# Patient Record
Sex: FEMALE | Race: WHITE | NOT HISPANIC OR LATINO | Employment: FULL TIME | URBAN - METROPOLITAN AREA
[De-identification: names, ages, dates, MRNs, and addresses within clinical notes are randomized per-mention and may not be internally consistent; named-entity substitution may affect disease eponyms.]

---

## 2023-01-07 ENCOUNTER — HOSPITAL ENCOUNTER (EMERGENCY)
Facility: HOSPITAL | Age: 33
Discharge: HOME | End: 2023-01-07
Attending: EMERGENCY MEDICINE
Payer: MEDICAID

## 2023-01-07 VITALS
BODY MASS INDEX: 33.74 KG/M2 | DIASTOLIC BLOOD PRESSURE: 71 MMHG | WEIGHT: 215 LBS | OXYGEN SATURATION: 100 % | HEIGHT: 67 IN | RESPIRATION RATE: 17 BRPM | TEMPERATURE: 99 F | SYSTOLIC BLOOD PRESSURE: 103 MMHG | HEART RATE: 58 BPM

## 2023-01-07 DIAGNOSIS — R10.13 EPIGASTRIC PAIN: Primary | ICD-10-CM

## 2023-01-07 LAB
ALBUMIN SERPL BCP-MCNC: 4 G/DL (ref 3.5–5.2)
ALP SERPL-CCNC: 44 U/L (ref 55–135)
ALT SERPL W/O P-5'-P-CCNC: 18 U/L (ref 10–44)
ANION GAP SERPL CALC-SCNC: 9 MMOL/L (ref 8–16)
AST SERPL-CCNC: 17 U/L (ref 10–40)
B-HCG UR QL: NEGATIVE
BASOPHILS # BLD AUTO: 0.11 K/UL (ref 0–0.2)
BASOPHILS NFR BLD: 0.7 % (ref 0–1.9)
BILIRUB SERPL-MCNC: 0.5 MG/DL (ref 0.1–1)
BILIRUB UR QL STRIP: NEGATIVE
BUN SERPL-MCNC: 10 MG/DL (ref 6–20)
CALCIUM SERPL-MCNC: 8.6 MG/DL (ref 8.7–10.5)
CHLORIDE SERPL-SCNC: 105 MMOL/L (ref 95–110)
CLARITY UR: ABNORMAL
CO2 SERPL-SCNC: 23 MMOL/L (ref 23–29)
COLOR UR: YELLOW
CREAT SERPL-MCNC: 0.7 MG/DL (ref 0.5–1.4)
CTP QC/QA: YES
DIFFERENTIAL METHOD: ABNORMAL
EOSINOPHIL # BLD AUTO: 0.2 K/UL (ref 0–0.5)
EOSINOPHIL NFR BLD: 1 % (ref 0–8)
ERYTHROCYTE [DISTWIDTH] IN BLOOD BY AUTOMATED COUNT: 14.2 % (ref 11.5–14.5)
EST. GFR  (NO RACE VARIABLE): >60 ML/MIN/1.73 M^2
GLUCOSE SERPL-MCNC: 111 MG/DL (ref 70–110)
GLUCOSE UR QL STRIP: NEGATIVE
HCT VFR BLD AUTO: 46.2 % (ref 37–48.5)
HGB BLD-MCNC: 15.1 G/DL (ref 12–16)
HGB UR QL STRIP: ABNORMAL
IMM GRANULOCYTES # BLD AUTO: 0.11 K/UL (ref 0–0.04)
IMM GRANULOCYTES NFR BLD AUTO: 0.7 % (ref 0–0.5)
KETONES UR QL STRIP: NEGATIVE
LEUKOCYTE ESTERASE UR QL STRIP: NEGATIVE
LIPASE SERPL-CCNC: 32 U/L (ref 4–60)
LYMPHOCYTES # BLD AUTO: 3.9 K/UL (ref 1–4.8)
LYMPHOCYTES NFR BLD: 25.3 % (ref 18–48)
MCH RBC QN AUTO: 32.9 PG (ref 27–31)
MCHC RBC AUTO-ENTMCNC: 32.7 G/DL (ref 32–36)
MCV RBC AUTO: 101 FL (ref 82–98)
MONOCYTES # BLD AUTO: 1 K/UL (ref 0.3–1)
MONOCYTES NFR BLD: 6.3 % (ref 4–15)
NEUTROPHILS # BLD AUTO: 10.1 K/UL (ref 1.8–7.7)
NEUTROPHILS NFR BLD: 66 % (ref 38–73)
NITRITE UR QL STRIP: NEGATIVE
NRBC BLD-RTO: 0 /100 WBC
PH UR STRIP: 7 [PH] (ref 5–8)
PLATELET # BLD AUTO: 343 K/UL (ref 150–450)
PMV BLD AUTO: 10.9 FL (ref 9.2–12.9)
POTASSIUM SERPL-SCNC: 3.9 MMOL/L (ref 3.5–5.1)
PROT SERPL-MCNC: 7.4 G/DL (ref 6–8.4)
PROT UR QL STRIP: ABNORMAL
RBC # BLD AUTO: 4.59 M/UL (ref 4–5.4)
SODIUM SERPL-SCNC: 137 MMOL/L (ref 136–145)
SP GR UR STRIP: >1.03 (ref 1–1.03)
URN SPEC COLLECT METH UR: ABNORMAL
UROBILINOGEN UR STRIP-ACNC: ABNORMAL EU/DL
WBC # BLD AUTO: 15.28 K/UL (ref 3.9–12.7)

## 2023-01-07 PROCEDURE — 96360 HYDRATION IV INFUSION INIT: CPT | Mod: 59

## 2023-01-07 PROCEDURE — 25500020 PHARM REV CODE 255: Performed by: EMERGENCY MEDICINE

## 2023-01-07 PROCEDURE — 81025 URINE PREGNANCY TEST: CPT | Performed by: EMERGENCY MEDICINE

## 2023-01-07 PROCEDURE — 83690 ASSAY OF LIPASE: CPT | Performed by: EMERGENCY MEDICINE

## 2023-01-07 PROCEDURE — 96361 HYDRATE IV INFUSION ADD-ON: CPT

## 2023-01-07 PROCEDURE — 81003 URINALYSIS AUTO W/O SCOPE: CPT | Performed by: EMERGENCY MEDICINE

## 2023-01-07 PROCEDURE — 99285 EMERGENCY DEPT VISIT HI MDM: CPT | Mod: 25

## 2023-01-07 PROCEDURE — 85025 COMPLETE CBC W/AUTO DIFF WBC: CPT | Performed by: EMERGENCY MEDICINE

## 2023-01-07 PROCEDURE — 25000003 PHARM REV CODE 250: Performed by: EMERGENCY MEDICINE

## 2023-01-07 PROCEDURE — 80053 COMPREHEN METABOLIC PANEL: CPT | Performed by: EMERGENCY MEDICINE

## 2023-01-07 RX ORDER — DICYCLOMINE HYDROCHLORIDE 20 MG/1
20 TABLET ORAL 2 TIMES DAILY
Qty: 20 TABLET | Refills: 0 | Status: SHIPPED | OUTPATIENT
Start: 2023-01-07 | End: 2023-02-06

## 2023-01-07 RX ORDER — ONDANSETRON 4 MG/1
4 TABLET, ORALLY DISINTEGRATING ORAL EVERY 6 HOURS PRN
Qty: 12 TABLET | Refills: 0 | Status: SHIPPED | OUTPATIENT
Start: 2023-01-07

## 2023-01-07 RX ADMIN — IOHEXOL 100 ML: 350 INJECTION, SOLUTION INTRAVENOUS at 02:01

## 2023-01-07 RX ADMIN — SODIUM CHLORIDE 1000 ML: 0.9 INJECTION, SOLUTION INTRAVENOUS at 01:01

## 2023-01-07 RX ADMIN — SODIUM CHLORIDE 1000 ML: 0.9 INJECTION, SOLUTION INTRAVENOUS at 03:01

## 2023-01-07 NOTE — ED PROVIDER NOTES
Encounter Date: 1/7/2023       History     Chief Complaint   Patient presents with    Abdominal Pain     X 5 DAYS    Nausea     32-year-old female presents complaining of epigastric abdominal discomfort patient reports that she has had this symptoms for the past 5 days patient reports that she went to the urgent care a few days ago and was started on medication for GERD as well as nausea medication but the medication did not help patient reports 1 episode of vomiting a few days ago patient reports persistent epigastric abdominal discomfort she describes pain as sharp and localizes it to the epigastric region rates it as 4/10 patient also reports pain in the left upper quadrant.  Patient has no other acute complaints at this time.  Patient denies vaginal discharge patient denies abnormal vaginal bleeding patient denies dysuria or change in bowel movements.    Review of patient's allergies indicates:  No Known Allergies  No past medical history on file.  No past surgical history on file.  No family history on file.     Review of Systems   Constitutional:  Negative for fever.   HENT:  Negative for congestion, rhinorrhea, sore throat and trouble swallowing.    Eyes:  Negative for visual disturbance.   Respiratory:  Negative for cough, chest tightness, shortness of breath and wheezing.    Cardiovascular:  Negative for chest pain, palpitations and leg swelling.   Gastrointestinal:  Positive for abdominal pain, nausea and vomiting. Negative for abdominal distention, constipation and diarrhea.   Genitourinary:  Negative for difficulty urinating, dysuria, flank pain and frequency.   Musculoskeletal:  Negative for arthralgias, back pain, joint swelling and neck pain.   Skin:  Negative for color change and rash.   Neurological:  Negative for dizziness, syncope, speech difficulty, weakness, numbness and headaches.   All other systems reviewed and are negative.    Physical Exam     Initial Vitals [01/07/23 1313]   BP Pulse Resp  Temp SpO2   108/74 70 18 98.6 °F (37 °C) 99 %      MAP       --         Physical Exam    Nursing note and vitals reviewed.  Constitutional: She appears well-developed and well-nourished. She is not diaphoretic. No distress.   HENT:   Head: Normocephalic and atraumatic.   Right Ear: External ear normal.   Left Ear: External ear normal.   Nose: Nose normal.   Mouth/Throat: Oropharynx is clear and moist. No oropharyngeal exudate.   Eyes: Conjunctivae and EOM are normal. Pupils are equal, round, and reactive to light. Right eye exhibits no discharge. Left eye exhibits no discharge. No scleral icterus.   Neck: Neck supple. No thyromegaly present. No tracheal deviation present. No JVD present.   Normal range of motion.  Cardiovascular:  Normal rate, regular rhythm, normal heart sounds and intact distal pulses.     Exam reveals no gallop and no friction rub.       No murmur heard.  Pulmonary/Chest: Breath sounds normal. No stridor. No respiratory distress. She has no wheezes. She has no rhonchi. She has no rales. She exhibits no tenderness.   Abdominal: Abdomen is soft. Bowel sounds are normal. She exhibits no distension and no mass. There is abdominal tenderness.   Mild tenderness in the epigastric region and the left upper quadrant no rebound or guarding noted There is no rebound and no guarding.   Musculoskeletal:         General: No tenderness or edema. Normal range of motion.      Cervical back: Normal range of motion and neck supple.     Lymphadenopathy:     She has no cervical adenopathy.   Neurological: She is alert and oriented to person, place, and time. She has normal strength. No cranial nerve deficit or sensory deficit.   Skin: Skin is warm and dry. No rash and no abscess noted. No erythema. No pallor.       ED Course   Procedures  Labs Reviewed   CBC W/ AUTO DIFFERENTIAL - Abnormal; Notable for the following components:       Result Value    WBC 15.28 (*)      (*)     MCH 32.9 (*)     Immature  Granulocytes 0.7 (*)     Gran # (ANC) 10.1 (*)     Immature Grans (Abs) 0.11 (*)     All other components within normal limits    Narrative:     For upper or mid abdominal pain.   COMPREHENSIVE METABOLIC PANEL - Abnormal; Notable for the following components:    Glucose 111 (*)     Calcium 8.6 (*)     Alkaline Phosphatase 44 (*)     All other components within normal limits    Narrative:     For upper or mid abdominal pain.   URINALYSIS, REFLEX TO URINE CULTURE - Abnormal; Notable for the following components:    Appearance, UA Hazy (*)     Specific Gravity, UA >1.030 (*)     Protein, UA Trace (*)     Occult Blood UA Trace (*)     Urobilinogen, UA 2.0-3.0 (*)     All other components within normal limits    Narrative:     In and Out Cath as needed it patient unable to void  Specimen Source->Urine   LIPASE    Narrative:     For upper or mid abdominal pain.   POCT URINE PREGNANCY          Imaging Results              CT Abdomen Pelvis With Contrast (Final result)  Result time 01/07/23 15:25:33      Final result by Julio Germain Jr., MD (01/07/23 15:25:33)                   Narrative:    CT ABDOMEN PELVIS WITH IV CONTRAST:  1/7/2023 2:49 PM CST    HISTORY:  Epigastric pain.    COMPARISON:  None available.    TECHNIQUE: Contrast-enhanced images of the abdomen and pelvis were obtained.  Dose lowering techniques were utilized which include adjusting the mA and/or kV to protocol and/or patient size. 100 cc of Omnipaque 350. No enteric contrast was employed for the examination.    ABDOMEN AND PELVIS:  LUNGS:  Visualized lung parenchyma is clear without mass or infiltrate.  LIVER:  Normal with no focal lesions.  GALLBLADDER: Unremarkable  SPLEEN:  Normal.  PANCREAS:  Normal.  ADRENAL GLANDS:  Normal.  KIDNEYS:  Normal.  GI TRACT: Both the small and large bowel loops are of normal caliber. Small clustering of nodes in right lower quadrant mesentery are nonspecific although attributed to represent low-grade inflammatory or  infectious process. Possibility of mesenteric adenitis is raised a benign finding.  AORTA / IVC:  The aorta and IVC are normal in caliber.  REPRODUCTIVE:  Uterus maintains normal size and overall contour. This evidence of a fairly well-defined crenulated focus in the right ovarian tissue posteriorly measuring 1.7 x 1.3 cm reflect benign corpus luteum cyst formation. Left ovary contains a small elliptical cyst about the outer edge. No free fluid in the pelvic cavity.  URINARY BLADDER:  Normal.  OTHER FINDINGS:  None.    LYMPH NODES:  Normal.    OSSEOUS STRUCTURES AND SOFT TISSUES:  Normal.    IMPRESSION:    1. No evidence of acute intra-abdominal process.  2. Small clustered nodes right lower quadrant mesentery are nonspecific although raise suspicion for mesenteric adenitis, a benign finding.  3. Crenulated focus the right ovary tissue favors a benign corpus luteum cyst without rupture.        Electronically signed by:  Julio Germain MD  1/7/2023 3:25 PM CST Workstation: [id number]                                     Medications   sodium chloride 0.9% bolus 1,000 mL 1,000 mL (0 mLs Intravenous Stopped 1/7/23 1453)   iohexoL (OMNIPAQUE 350) injection 100 mL (100 mLs Intravenous Given 1/7/23 1446)   sodium chloride 0.9% bolus 1,000 mL 1,000 mL (0 mLs Intravenous Stopped 1/7/23 1644)     Medical Decision Making:   History:   Old Medical Records: I decided to obtain old medical records.  Initial Assessment:   Emergent evaluation of a 32-year-old female presenting with abdominal pain differential diagnosis includes electrolyte abnormality, GERD, infection, obstruction          Attending Attestation:             Attending ED Notes:   Patient's labs show mild elevation in white blood cell count otherwise no significant acute abnormalities, patient imaging shows mild mesenteric adenitis otherwise no acute findings patient reports that she is feeling better, patient will be started on a course of nausea medication and  Bentyl patient is advised to hydrate well patient is to follow up with PCP in the next 2-3 days for further evaluation and management patient is advised to return immediately to the ER for any worsening or for any further concerns.  Patient is tolerating p.o. in ER.    MDM    Patient presents for emergent evaluation of acute complaint that poses a possible threat to life and/or bodily function.    I ordered labs and personally reviewed them.  Labs significant for abnormalities noted above.    I ordered X-rays and personally reviewed them and reviewed the radiologist interpretation.  Xray significant for findings noted above.    I ordered EKG and personally reviewed it.  EKG significant for findings noted above.    I ordered CT scan and personally reviewed it and reviewed the radiologist interpretation.  CT significant for findings noted above.      I had a detailed discussion with the patient and/or guardian regarding: The historical points, exam findings, and diagnostic results supporting the discharge diagnosis, lab results, pertinent radiology results, and the need for outpatient follow-up, for definitive care with a family practitioner and to return to the emergency department if symptoms worsen or persist or if there are any questions or concerns that arise at home. All questions have been answered in detail. Strict return to Emergency Department precautions have been provided.     A dictation software program was used for this note.  Please expect some simple typographical  errors in this note.                        Clinical Impression:   Final diagnoses:  [R10.13] Epigastric pain (Primary)        ED Disposition Condition    Discharge Stable          ED Prescriptions       Medication Sig Dispense Start Date End Date Auth. Provider    ondansetron (ZOFRAN-ODT) 4 MG TbDL Take 1 tablet (4 mg total) by mouth every 6 (six) hours as needed. 12 tablet 1/7/2023 -- Jared Bangura MD    dicyclomine (BENTYL) 20 mg tablet  Take 1 tablet (20 mg total) by mouth 2 (two) times daily. 20 tablet 1/7/2023 2/6/2023 Jared Bangura MD          Follow-up Information       Follow up With Specialties Details Why Contact Info Additional Information    [facility] - Emergency Dept Emergency Medicine  If symptoms worsen [address] [facility] [address]  [phone] 1st floor    Anil Garcia III, MD Gastroenterology Schedule an appointment as soon as possible for a visit in 2 days  [id number] [place] [address]  [phone]       Fly Hartmann MD Internal Medicine Schedule an appointment as soon as possible for a visit in 2 days  [address] [place] [address] [address]  [facility] [address]  [phone]                Jared Bangura MD  01/07/23 2049

## 2023-01-11 ENCOUNTER — TELEPHONE (OUTPATIENT)
Dept: FAMILY MEDICINE | Facility: CLINIC | Age: 33
End: 2023-01-11
Payer: MEDICAID

## 2023-01-11 NOTE — TELEPHONE ENCOUNTER
----- Message from Lai Valentine sent at 1/11/2023  2:46 PM CST -----  Contact: pt at [phone]  Type:  Sooner Appointment Request    Caller is requesting a sooner appointment.  Caller declined first available appointment listed below.  Caller will not accept being placed on the waitlist and is requesting a message be sent to doctor.    Name of Caller:  pt  When is the first available appointment?  N/A  Symptoms:  check up  Best Call Back Number:  [phone]  Additional Information:  pt is calling the office to schedule an appt but nothing comes up in the system. Please call back and advise.

## 2023-01-11 NOTE — TELEPHONE ENCOUNTER
Call placed to patient for notification [facility] is not currently accepting new Medicaid patients. Recommended for patient to call [facility]. Patient verbalized understanding.

## 2023-02-15 ENCOUNTER — HOSPITAL ENCOUNTER (EMERGENCY)
Facility: HOSPITAL | Age: 33
Discharge: HOME | End: 2023-02-15
Attending: EMERGENCY MEDICINE
Payer: MEDICAID

## 2023-02-15 VITALS
OXYGEN SATURATION: 99 % | WEIGHT: 215 LBS | SYSTOLIC BLOOD PRESSURE: 119 MMHG | BODY MASS INDEX: 33.74 KG/M2 | RESPIRATION RATE: 18 BRPM | DIASTOLIC BLOOD PRESSURE: 62 MMHG | TEMPERATURE: 97 F | HEART RATE: 82 BPM | HEIGHT: 67 IN

## 2023-02-15 DIAGNOSIS — M79.671 FOOT PAIN, RIGHT: Primary | ICD-10-CM

## 2023-02-15 DIAGNOSIS — B07.0 VERRUCA PEDIS: ICD-10-CM

## 2023-02-15 DIAGNOSIS — R52 PAIN: ICD-10-CM

## 2023-02-15 LAB
B-HCG UR QL: NEGATIVE
CTP QC/QA: YES

## 2023-02-15 PROCEDURE — 81025 URINE PREGNANCY TEST: CPT | Performed by: EMERGENCY MEDICINE

## 2023-02-15 PROCEDURE — 99283 EMERGENCY DEPT VISIT LOW MDM: CPT

## 2023-02-15 RX ORDER — MUPIROCIN 20 MG/G
OINTMENT TOPICAL 2 TIMES DAILY
Qty: 1 EACH | Refills: 0 | Status: SHIPPED | OUTPATIENT
Start: 2023-02-15 | End: 2023-02-25

## 2023-02-15 NOTE — ED PROVIDER NOTES
Encounter Date: 2/15/2023       History     Chief Complaint   Patient presents with    Foot Pain     Right     31 y/o female presents for right heel pain X 3 months. There is a white circular scab on there heel of her foot that hurts when she stands for long periods of time. There is no pain on palpation and pt has full ROM of toes and ankle. Pt denies any trauma or known injury. She has no pmh and takes no daily medications. She denies fever, myalgia, rash, cp, sob. She's tried no medication to relieve pain.     Review of patient's allergies indicates:  No Known Allergies  No past medical history on file.  No past surgical history on file.  No family history on file.     Review of Systems   Constitutional:  Negative for activity change, appetite change, chills and fever.   HENT:  Negative for congestion, mouth sores and sore throat.    Respiratory:  Negative for apnea, cough and shortness of breath.    Cardiovascular:  Negative for chest pain.   Gastrointestinal:  Negative for abdominal pain, nausea and vomiting.   Genitourinary:  Negative for dysuria and hematuria.   Musculoskeletal:  Negative for back pain and myalgias.   Skin:  Positive for wound. Negative for rash.   Neurological:  Negative for weakness, light-headedness and headaches.   Hematological:  Does not bruise/bleed easily.     Physical Exam     Initial Vitals [02/15/23 1558]   BP Pulse Resp Temp SpO2   119/62 82 18 97.4 °F (36.3 °C) 99 %      MAP       --         Physical Exam    Nursing note and vitals reviewed.  Constitutional: She appears well-developed and well-nourished. She is not diaphoretic. No distress.   HENT:   Head: Normocephalic and atraumatic.   Right Ear: External ear normal.   Left Ear: External ear normal.   Nose: Nose normal.   Mouth/Throat: Oropharynx is clear and moist.   Eyes: Conjunctivae and EOM are normal. Pupils are equal, round, and reactive to light. No scleral icterus.   Neck: Neck supple.   Normal range of  motion.  Cardiovascular:  Normal rate, regular rhythm, normal heart sounds and intact distal pulses.           Pulmonary/Chest: Breath sounds normal. She has no wheezes.   Abdominal: Abdomen is soft. Bowel sounds are normal. She exhibits no distension.   Musculoskeletal:         General: No tenderness or edema. Normal range of motion.      Cervical back: Normal range of motion and neck supple.     Neurological: She is alert and oriented to person, place, and time. She has normal strength and normal reflexes.   Skin: Skin is warm and dry.   2 cm circular skin avulsion without drainage, erythema, or ttp. There is nodular mass felt below skin at avulsion site. She has no pain to the bottom of the foot with palpation. Pt has full active ROM with passive movement to toes and ankle. Watched pt ambulate without difficulty   Psychiatric: She has a normal mood and affect.       ED Course   Procedures  Labs Reviewed   POCT URINE PREGNANCY          Imaging Results              X-Ray Foot Complete Right (Final result)  Result time 02/15/23 17:28:31      Final result by Kelsy Boss MD (02/15/23 17:28:31)                   Narrative:    3 views of the right foot    Clinical history plantar foot pain        There are no fractures, dislocations or acute osseous abnormalities. The soft tissues are normal.    IMPRESSION: Negative right foot    Electronically signed by:  Kelsy Boss MD  2/15/2023 5:28 PM CST Workstation: [id number]                                     Medications - No data to display  Medical Decision Making:   Initial Assessment:   33 y/o female presents for right heel pain X 3 months. There is a white circular scab on there heel of her foot that hurts when she stands for long periods of time. There is no pain on palpation and pt has full ROM of toes and ankle. Pt denies any trauma or known injury. She has no pmh and takes no daily medications. She denies fever, myalgia, rash, cp, sob.   Differential  Diagnosis:   Considerations include laceration, contusion, FB, plantar fasciitis  Clinical Tests:   Lab Tests: Ordered and Reviewed  The following lab test(s) were unremarkable: UPT  Radiological Study: Ordered and Reviewed  ED Management:  33 y/o female presents for right heel pain X 3 months that only occurs when stand on feet for long periods of time. 2 cm circular skin avulsion without drainage, erythema, or ttp. Witnessed pt ambulate without difficulty. She has no pain to the plantar foot with palpation. There is no pain to wound site with palpation. Pt states pain only occurs when walking for an extended time. X ray without fractures, dislocations or acute osseous abnormalities. The soft tissues are normal. Advised to clean wound and cover with bactroban. Referral to podiatry placed.   Attending Note:   I discussed the patient's care with Advanced Practice Clinician.  I reviewed their note and agree with the history, physical, assessment, diagnosis, treatment, all procedures performed, xray and EKG interpretations and discharge plan provided by the Advanced Practice Clinician. My overall impression is verruca pedis and right foot pain  The patient has been instructed to follow up with their physician or the one provided as well as specific return precautions.   Mary Snow M.D. 2/15/2023 6:31 PM                            Clinical Impression:   Final diagnoses:  [R52] Pain  [M79.671] Foot pain, right (Primary)  [B07.0] Verruca pedis        ED Disposition Condition    Discharge Stable          ED Prescriptions       Medication Sig Dispense Start Date End Date Auth. Provider    mupirocin (BACTROBAN) 2 % ointment Apply topically 2 (two) times daily. for 10 days 1 each 2/15/2023 2/25/2023 DARRIN Manrique          Follow-up Information       Follow up With Specialties Details Why Contact Info    Janette Mejia DPM Podiatry, Surgery, Wound Care Schedule an appointment as soon as possible for a visit in 3  days  [address] [facility]  [address]  [facility]  [place] [address]  [phone]               DARRIN Manrique  02/15/23 180       Mary Snow MD  02/15/23 5599

## 2023-02-16 NOTE — DISCHARGE INSTRUCTIONS
Tylenol or Motrin for pain  Use antibiotic ointment as directed  Follow-up as directed for definitive care   Return for any concerns

## 2023-02-17 ENCOUNTER — PATIENT OUTREACH (OUTPATIENT)
Dept: EMERGENCY MEDICINE | Facility: HOSPITAL | Age: 33
End: 2023-02-17

## 2023-02-17 NOTE — PROGRESS NOTES
Sofia Dexter  ED Navigator  Emergency Department    Project: [facility] ED Navigator  Role: Community Health Worker    Date: 02/17/2023  Patient Name: Muriel Vasquez  MRN: [id number]  PCP: Primary Doctor No    Assessment:     Muriel Vasquez is a 32 y.o. female who has presented to ED for Foot Pain. Patient has visited the ED 2 times in the past 3 months. Patient did not contact PCP.     ED Navigator Initial Assessment    ED Navigator Enrollment Documentation  Consent to Services  Does patient consent to completing the assessment?: Yes  Contact  Method of Initial Contact: Phone  Transportation  Does the patient have issues with Transportation?: No  Does the patient have transportation to and from healthcare appointments?: Yes  Insurance Coverage  Do you have coverage/adequate coverage?: Yes  Type/kind of coverage: [place] [employer]  Is patient able to afford co-pays/deductibles?: Yes  Is patient able to afford HME or supplies?: Yes  Does patient have an established [facility] PCP?: No  Does patient need assistance finding a PCP?: Yes  Does the patient have a lack of adequate coverage?: No  Specialist Appointment  Did the patient come to the ED to see a specialist?: No  Does the patient have a pending specialist referral?: No  Does the patient have a specialist appointment made?: No  PCP Follow Up Appointment  Has the patient had an appointment with a primary care provider in the past year?: No  Does the patient have a follow up appontment with a PCP?: No  When was the last time you saw your PCP?: 2/17/22  Why does the patient not have a follow up scheduled?: No established [facility]/outside PCP  Would you like an [facility] PCP?: Yes  Medications  Is patient able to afford medication?: Yes  Is patient unable to get medication due to lack of transportation?: No  Psychological  Does the patient have psycho-social concerns?: Yes  What concerns does the patient have?: Anxiety and/or Depression  Food  Does the patient have  concerns about food?: No  Communication/Education  Does the patient have limited English proficiency/English not primary language?: No  Does patient have low literacy and/or low health literacy?: Yes  Does patient have concerns with care?: No  Does patient have dissatisfaction with care?: No  Other Financial Concerns  Does the patient have immediate financial distress?: No  Does the patient have general financial concerns?: Yes  Other Social Barriers/Concerns  Does the patient have any additional barriers or concerns?: Unable to afford utilities, Other (see comments)  Primary Barrier  Barriers identified: Cognitive barrier (health literacy, language and communication, etc.)  Root Cause of ED Utilization: Patient Knowledge/Low Health Literacy  Plan to address Patient Knowledge/Low Health Literacy: Provided information for [facility] 24/7 Nurse triage line [phone] or [facility] ([phone])  Next steps: Provided Education  Was education/educational materials provided surrounding PCP services/creating a medical home?: Yes Was education verbal or written?: Written     Was education/educational materials provided surrounding low cost, healthy foods?: Yes Was education verbal or written?: Written     Was education/educational materials provided surrounding other items? If so, use comment to explain.: Yes Was education verbal or written?: Written   Plan: Utility payment assistance resource given for their region  Expected Date of Follow Up 1: 3/17/23         Social History     Socioeconomic History    Marital status: Single   Tobacco Use    Smoking status: Every Day     Types: Cigarettes     Social Determinants of Health     Financial Resource Strain: Low Risk     Difficulty of Paying Living Expenses: Not very hard   Food Insecurity: No Food Insecurity    Worried About Running Out of Food in the Last Year: Never true    Ran Out of Food in the Last Year: Never true   Transportation Needs: No Transportation  Needs    Lack of Transportation (Medical): No    Lack of Transportation (Non-Medical): No   Stress: No Stress Concern Present    Feeling of Stress : Only a little   Social Connections: Unknown    Frequency of Communication with Friends and Family: Once a week    Frequency of Social Gatherings with Friends and Family: Once a week    Marital Status: Never    Housing Stability: High Risk    Unable to Pay for Housing in the Last Year: Yes    Unstable Housing in the Last Year: No       Plan:   Spoke with patient on the telephone and completed initial enrollment.  Patient reported no concerns with food, housing, or transportation, however, she stated her bills are sometimes paid late.  Patient does not have a PCP and accepted my offer to help her establish one.  Patient stated she called the podiatrist that the ER told her to, but they have not called back.  I urged patient to let me know early next week if she has not heard back so I can assist, and I also provided her with a list of Podiatrists from the [employer] web site.  Patient requested information on a gynecologist who accepts her insurance.   Patient stated she is a daily smoker but does not want to quit.  She reported no concerns with alcohol use.  Patient said she has periods where she feels sadder than normal and has anxiety as well.  Patient was given education on ([facility] information, [facility] Virtual [facility] information, and Heart healthy diet tips), [facility] Nurse Triage line, list of gynecologists from the [employer] web site, [facility], list of organizations that offer financial assistance with daily living expenses, and behavioral health resources.  An email was sent to Glenny Lyons at [facility] requesting an appointment for patient.    Sofia Dexter  ED Navigator

## 2023-04-24 ENCOUNTER — TELEPHONE (OUTPATIENT)
Dept: HEMATOLOGY/ONCOLOGY | Facility: CLINIC | Age: 33
End: 2023-04-24
Payer: MEDICAID

## 2023-04-24 NOTE — TELEPHONE ENCOUNTER
----- Message from Oanh Puckett sent at 4/24/2023  3:35 PM CDT -----  Type: Needs Medical Advice  Who Called:  Patient   Symptoms (please be specific):    How long has patient had these symptoms:    Pharmacy name and phone #:    Best Call Back Number: [phone]  Additional Information: Patient is requesting a call back to schedule NP appt. with [facility].

## 2023-04-24 NOTE — NURSING
Oncology Navigation   Intake  Cancer Type: Benign hem  Internal / External Referral: External  Date of Referral: 04/24/23  Initial Nurse Navigator Contact: 04/24/23  Referral to Initial Contact Timeline (days): 0  Date Worked: 04/24/23  First Appointment Available: 04/25/23  Appointment Date: 04/27/23 (Dr Bishop)  First Available Date vs. Scheduled Date (days): 2     Treatment                              Acuity      Follow Up  No follow-ups on file.

## 2023-04-27 ENCOUNTER — OFFICE VISIT (OUTPATIENT)
Dept: HEMATOLOGY/ONCOLOGY | Facility: CLINIC | Age: 33
End: 2023-04-27
Payer: MEDICAID

## 2023-04-27 VITALS
BODY MASS INDEX: 37.47 KG/M2 | DIASTOLIC BLOOD PRESSURE: 58 MMHG | SYSTOLIC BLOOD PRESSURE: 116 MMHG | HEIGHT: 67 IN | RESPIRATION RATE: 10 BRPM | HEART RATE: 77 BPM | OXYGEN SATURATION: 98 % | TEMPERATURE: 98 F | WEIGHT: 238.75 LBS

## 2023-04-27 DIAGNOSIS — D72.829 LEUKOCYTOSIS, UNSPECIFIED TYPE: Primary | ICD-10-CM

## 2023-04-27 PROCEDURE — 3078F PR MOST RECENT DIASTOLIC BLOOD PRESSURE < 80 MM HG: ICD-10-PCS | Mod: CPTII,,, | Performed by: INTERNAL MEDICINE

## 2023-04-27 PROCEDURE — 3074F SYST BP LT 130 MM HG: CPT | Mod: CPTII,,, | Performed by: INTERNAL MEDICINE

## 2023-04-27 PROCEDURE — 99999 PR PBB SHADOW E&M-EST. PATIENT-LVL III: ICD-10-PCS | Mod: PBBFAC,,, | Performed by: INTERNAL MEDICINE

## 2023-04-27 PROCEDURE — 99999 PR PBB SHADOW E&M-EST. PATIENT-LVL III: CPT | Mod: PBBFAC,,, | Performed by: INTERNAL MEDICINE

## 2023-04-27 PROCEDURE — 3078F DIAST BP <80 MM HG: CPT | Mod: CPTII,,, | Performed by: INTERNAL MEDICINE

## 2023-04-27 PROCEDURE — 1159F MED LIST DOCD IN RCRD: CPT | Mod: CPTII,,, | Performed by: INTERNAL MEDICINE

## 2023-04-27 PROCEDURE — 3008F BODY MASS INDEX DOCD: CPT | Mod: CPTII,,, | Performed by: INTERNAL MEDICINE

## 2023-04-27 PROCEDURE — 3008F PR BODY MASS INDEX (BMI) DOCUMENTED: ICD-10-PCS | Mod: CPTII,,, | Performed by: INTERNAL MEDICINE

## 2023-04-27 PROCEDURE — 99204 OFFICE O/P NEW MOD 45 MIN: CPT | Mod: S$PBB,,, | Performed by: INTERNAL MEDICINE

## 2023-04-27 PROCEDURE — 1159F PR MEDICATION LIST DOCUMENTED IN MEDICAL RECORD: ICD-10-PCS | Mod: CPTII,,, | Performed by: INTERNAL MEDICINE

## 2023-04-27 PROCEDURE — 99204 PR OFFICE/OUTPT VISIT, NEW, LEVL IV, 45-59 MIN: ICD-10-PCS | Mod: S$PBB,,, | Performed by: INTERNAL MEDICINE

## 2023-04-27 PROCEDURE — 3074F PR MOST RECENT SYSTOLIC BLOOD PRESSURE < 130 MM HG: ICD-10-PCS | Mod: CPTII,,, | Performed by: INTERNAL MEDICINE

## 2023-04-27 PROCEDURE — 99213 OFFICE O/P EST LOW 20 MIN: CPT | Mod: PBBFAC,PN | Performed by: INTERNAL MEDICINE

## 2023-04-27 RX ORDER — ERGOCALCIFEROL 1.25 MG/1
50000 CAPSULE ORAL
COMMUNITY
Start: 2023-04-26

## 2023-04-27 RX ORDER — AZITHROMYCIN 250 MG/1
250 TABLET, FILM COATED ORAL DAILY
COMMUNITY
Start: 2023-04-26

## 2023-04-27 NOTE — PROGRESS NOTES
HPI    33 years old female referred to H[facility]ology Clinic for evaluation of elevated WBC.  No evidence of reflux symptoms.  Patient smokes cigarettes.  Denies chest pain shortness breath.  Denies adenopathy denies fever chills denies drenching night sweats.      No past medical history on file.  Social History     Socioeconomic History    Marital status: Single   Tobacco Use    Smoking status: Every Day     Types: Cigarettes     Social Determinants of Health     Financial Resource Strain: Low Risk     Difficulty of Paying Living Expenses: Not very hard   Food Insecurity: No Food Insecurity    Worried About Running Out of Food in the Last Year: Never true    Ran Out of Food in the Last Year: Never true   Transportation Needs: No Transportation Needs    Lack of Transportation (Medical): No    Lack of Transportation (Non-Medical): No   Stress: No Stress Concern Present    Feeling of Stress : Only a little   Social Connections: Unknown    Frequency of Communication with Friends and Family: Once a week    Frequency of Social Gatherings with Friends and Family: Once a week    Marital Status: Never    Housing Stability: High Risk    Unable to Pay for Housing in the Last Year: Yes    Unstable Housing in the Last Year: No         Objective    Physical Exam     Vitals:    04/27/23 1332   BP: (!) 116/58   Pulse: 77   Resp: 10   Temp: 97.7 °F (36.5 °C)     Awake no acute distress  Normocephalic atraumatic  Pupils equal round reactive  Normal rate  Normal speech pattern  No rash no lesion  Nonfocal    Lab Results   Component Value Date    WBC 15.28 (H) 01/07/2023    HGB 15.1 01/07/2023    HCT 46.2 01/07/2023     (H) 01/07/2023     01/07/2023       CMP  Sodium   Date Value Ref Range Status   01/07/2023 137 136 - 145 mmol/L Final     Potassium   Date Value Ref Range Status   01/07/2023 3.9 3.5 - 5.1 mmol/L Final     Chloride   Date Value Ref Range Status   01/07/2023 105 95 - 110 mmol/L Final     CO2   Date  Value Ref Range Status   01/07/2023 23 23 - 29 mmol/L Final     Glucose   Date Value Ref Range Status   01/07/2023 111 (H) 70 - 110 mg/dL Final     BUN   Date Value Ref Range Status   01/07/2023 10 6 - 20 mg/dL Final     Creatinine   Date Value Ref Range Status   01/07/2023 0.7 0.5 - 1.4 mg/dL Final     Calcium   Date Value Ref Range Status   01/07/2023 8.6 (L) 8.7 - 10.5 mg/dL Final     Total Protein   Date Value Ref Range Status   01/07/2023 7.4 6.0 - 8.4 g/dL Final     Albumin   Date Value Ref Range Status   01/07/2023 4.0 3.5 - 5.2 g/dL Final     Total Bilirubin   Date Value Ref Range Status   01/07/2023 0.5 0.1 - 1.0 mg/dL Final     Comment:     For infants and newborns, interpretation of results should be based  on gestational age, weight and in agreement with clinical  observations.    Premature Infant recommended reference ranges:  Up to 24 hours.............<8.0 mg/dL  Up to 48 hours............<12.0 mg/dL  3-5 days..................<15.0 mg/dL  6-29 days.................<15.0 mg/dL       Alkaline Phosphatase   Date Value Ref Range Status   01/07/2023 44 (L) 55 - 135 U/L Final     AST   Date Value Ref Range Status   01/07/2023 17 10 - 40 U/L Final     ALT   Date Value Ref Range Status   01/07/2023 18 10 - 44 U/L Final     Anion Gap   Date Value Ref Range Status   01/07/2023 9 8 - 16 mmol/L Final     eGFR   Date Value Ref Range Status   01/07/2023 >60.0 >60 mL/min/1.73 m^2 Final     Peripheral pathology  Examination of the peripheral blood smear reveal macrocytic   erythrocytes with polychromasia. No target cells or schistocytes are   identified. Mild leukocytosis is seen. No hypersegmented neutrophils   are identified. No blasts are identified. Platelets are present in   normal numbers and demonstrate a normal morphology.     Impression: anemia and mild leukocytosis   Assessment    Elevated WBC with elevated granulocyte percentage.    WBCs persistent but mild.  No evidence of reflux symptoms.    > will  repeat the blood work with the reactive protein and sed rate.  I am more leaning towards this is a reactive process.  > request peripheral pathology report.  > return to clinic 10 weeks.  I do not believe this is is associated with malignancy.  We will continue to monitor.  If condition worsening we will order further evaluation.    Plan    There are no diagnoses linked to this encounter.

## 2023-04-27 NOTE — Clinical Note
Supposed to follow back in 2 weeks but forgot to close the chart I just wondering if patient still wants to return to us to discuss the results.  If not we can repeat the blood work and see her in end of Sana

## 2023-05-05 ENCOUNTER — LAB VISIT (OUTPATIENT)
Dept: LAB | Facility: HOSPITAL | Age: 33
End: 2023-05-05
Attending: INTERNAL MEDICINE
Payer: MEDICAID

## 2023-05-05 DIAGNOSIS — D72.829 LEUKOCYTOSIS, UNSPECIFIED TYPE: ICD-10-CM

## 2023-05-05 LAB
ALBUMIN SERPL BCP-MCNC: 3.6 G/DL (ref 3.5–5.2)
ALP SERPL-CCNC: 53 U/L (ref 55–135)
ALT SERPL W/O P-5'-P-CCNC: 20 U/L (ref 10–44)
ANION GAP SERPL CALC-SCNC: 8 MMOL/L (ref 8–16)
AST SERPL-CCNC: 13 U/L (ref 10–40)
BASOPHILS NFR BLD: 0 % (ref 0–1.9)
BILIRUB SERPL-MCNC: 0.4 MG/DL (ref 0.1–1)
BUN SERPL-MCNC: 7 MG/DL (ref 6–20)
CALCIUM SERPL-MCNC: 9.2 MG/DL (ref 8.7–10.5)
CHLORIDE SERPL-SCNC: 106 MMOL/L (ref 95–110)
CO2 SERPL-SCNC: 26 MMOL/L (ref 23–29)
CREAT SERPL-MCNC: 0.8 MG/DL (ref 0.5–1.4)
CRP SERPL-MCNC: 19.5 MG/L (ref 0–8.2)
DIFFERENTIAL METHOD: ABNORMAL
EOSINOPHIL NFR BLD: 2 % (ref 0–8)
ERYTHROCYTE [DISTWIDTH] IN BLOOD BY AUTOMATED COUNT: 14.3 % (ref 11.5–14.5)
ERYTHROCYTE [SEDIMENTATION RATE] IN BLOOD BY WESTERGREN METHOD: 4 MM/HR (ref 0–20)
EST. GFR  (NO RACE VARIABLE): >60 ML/MIN/1.73 M^2
GLUCOSE SERPL-MCNC: 113 MG/DL (ref 70–110)
HCT VFR BLD AUTO: 44 % (ref 37–48.5)
HGB BLD-MCNC: 14.5 G/DL (ref 12–16)
IMM GRANULOCYTES # BLD AUTO: ABNORMAL K/UL
IMM GRANULOCYTES NFR BLD AUTO: ABNORMAL %
LDH SERPL L TO P-CCNC: 171 U/L (ref 110–260)
LYMPHOCYTES NFR BLD: 22 % (ref 18–48)
MCH RBC QN AUTO: 32.5 PG (ref 27–31)
MCHC RBC AUTO-ENTMCNC: 33 G/DL (ref 32–36)
MCV RBC AUTO: 99 FL (ref 82–98)
MONOCYTES NFR BLD: 0 % (ref 4–15)
NEUTROPHILS NFR BLD: 75 % (ref 38–73)
NRBC BLD-RTO: 0 /100 WBC
PATH REV BLD -IMP: NORMAL
PLATELET # BLD AUTO: 396 K/UL (ref 150–450)
PLATELET BLD QL SMEAR: ABNORMAL
PMV BLD AUTO: 10.6 FL (ref 9.2–12.9)
POTASSIUM SERPL-SCNC: 4 MMOL/L (ref 3.5–5.1)
PROT SERPL-MCNC: 7 G/DL (ref 6–8.4)
RBC # BLD AUTO: 4.46 M/UL (ref 4–5.4)
SODIUM SERPL-SCNC: 140 MMOL/L (ref 136–145)
WBC # BLD AUTO: 15.1 K/UL (ref 3.9–12.7)

## 2023-05-05 PROCEDURE — 86140 C-REACTIVE PROTEIN: CPT | Performed by: INTERNAL MEDICINE

## 2023-05-05 PROCEDURE — 85651 RBC SED RATE NONAUTOMATED: CPT | Performed by: INTERNAL MEDICINE

## 2023-05-05 PROCEDURE — 83615 LACTATE (LD) (LDH) ENZYME: CPT | Performed by: INTERNAL MEDICINE

## 2023-05-05 PROCEDURE — 80053 COMPREHEN METABOLIC PANEL: CPT | Performed by: INTERNAL MEDICINE

## 2023-05-05 PROCEDURE — 85060 BLOOD SMEAR INTERPRETATION: CPT | Mod: ,,, | Performed by: STUDENT IN AN ORGANIZED HEALTH CARE EDUCATION/TRAINING PROGRAM

## 2023-05-05 PROCEDURE — 85027 COMPLETE CBC AUTOMATED: CPT | Performed by: INTERNAL MEDICINE

## 2023-05-05 PROCEDURE — 85060 PATHOLOGIST REVIEW: ICD-10-PCS | Mod: ,,, | Performed by: STUDENT IN AN ORGANIZED HEALTH CARE EDUCATION/TRAINING PROGRAM

## 2023-05-05 PROCEDURE — 86038 ANTINUCLEAR ANTIBODIES: CPT | Performed by: INTERNAL MEDICINE

## 2023-05-05 PROCEDURE — 80074 ACUTE HEPATITIS PANEL: CPT | Performed by: INTERNAL MEDICINE

## 2023-05-05 PROCEDURE — 85007 BL SMEAR W/DIFF WBC COUNT: CPT | Performed by: INTERNAL MEDICINE

## 2023-05-05 PROCEDURE — 36415 COLL VENOUS BLD VENIPUNCTURE: CPT | Performed by: INTERNAL MEDICINE

## 2023-05-05 PROCEDURE — 87389 HIV-1 AG W/HIV-1&-2 AB AG IA: CPT | Performed by: INTERNAL MEDICINE

## 2023-05-06 LAB
HAV IGM SERPL QL IA: NORMAL
HBV CORE IGM SERPL QL IA: NORMAL
HBV SURFACE AG SERPL QL IA: NORMAL
HCV AB SERPL QL IA: NORMAL
HIV 1+2 AB+HIV1 P24 AG SERPL QL IA: NORMAL

## 2023-05-08 LAB
ANA SER QL IF: NORMAL
PATH REV BLD -IMP: NORMAL

## 2024-04-29 DIAGNOSIS — M79.89 OTHER SPECIFIED SOFT TISSUE DISORDERS: Primary | ICD-10-CM

## 2024-07-27 ENCOUNTER — HOSPITAL ENCOUNTER (EMERGENCY)
Facility: HOSPITAL | Age: 34
Discharge: HOME | End: 2024-07-27
Attending: EMERGENCY MEDICINE
Payer: MEDICAID

## 2024-07-27 VITALS
TEMPERATURE: 98 F | HEART RATE: 56 BPM | SYSTOLIC BLOOD PRESSURE: 114 MMHG | BODY MASS INDEX: 36.1 KG/M2 | WEIGHT: 230 LBS | HEIGHT: 67 IN | DIASTOLIC BLOOD PRESSURE: 73 MMHG | RESPIRATION RATE: 16 BRPM | OXYGEN SATURATION: 95 %

## 2024-07-27 DIAGNOSIS — K80.20 CALCULUS OF GALLBLADDER WITHOUT CHOLECYSTITIS WITHOUT OBSTRUCTION: Primary | ICD-10-CM

## 2024-07-27 DIAGNOSIS — K80.50 BILIARY COLIC: ICD-10-CM

## 2024-07-27 LAB
ALBUMIN SERPL BCP-MCNC: 3.6 G/DL (ref 3.5–5.2)
ALP SERPL-CCNC: 43 U/L (ref 55–135)
ALT SERPL W/O P-5'-P-CCNC: 9 U/L (ref 10–44)
ANION GAP SERPL CALC-SCNC: 3 MMOL/L (ref 8–16)
AST SERPL-CCNC: 11 U/L (ref 10–40)
BACTERIA #/AREA URNS HPF: ABNORMAL /HPF
BASOPHILS # BLD AUTO: 0.07 K/UL (ref 0–0.2)
BASOPHILS NFR BLD: 0.7 % (ref 0–1.9)
BILIRUB SERPL-MCNC: 0.3 MG/DL (ref 0.1–1)
BILIRUB UR QL STRIP: NEGATIVE
BUN SERPL-MCNC: 8 MG/DL (ref 6–20)
CALCIUM SERPL-MCNC: 8.4 MG/DL (ref 8.7–10.5)
CAOX CRY URNS QL MICRO: ABNORMAL
CHLORIDE SERPL-SCNC: 107 MMOL/L (ref 95–110)
CLARITY UR: ABNORMAL
CO2 SERPL-SCNC: 29 MMOL/L (ref 23–29)
COLOR UR: YELLOW
CREAT SERPL-MCNC: 0.8 MG/DL (ref 0.5–1.4)
DIFFERENTIAL METHOD BLD: ABNORMAL
EOSINOPHIL # BLD AUTO: 0.5 K/UL (ref 0–0.5)
EOSINOPHIL NFR BLD: 5.6 % (ref 0–8)
ERYTHROCYTE [DISTWIDTH] IN BLOOD BY AUTOMATED COUNT: 15.3 % (ref 11.5–14.5)
EST. GFR  (NO RACE VARIABLE): >60 ML/MIN/1.73 M^2
GLUCOSE SERPL-MCNC: 100 MG/DL (ref 70–110)
GLUCOSE UR QL STRIP: NEGATIVE
HCT VFR BLD AUTO: 36.4 % (ref 37–48.5)
HGB BLD-MCNC: 11.7 G/DL (ref 12–16)
HGB UR QL STRIP: ABNORMAL
HYALINE CASTS #/AREA URNS LPF: 0 /LPF
IMM GRANULOCYTES # BLD AUTO: 0.03 K/UL (ref 0–0.04)
IMM GRANULOCYTES NFR BLD AUTO: 0.3 % (ref 0–0.5)
KETONES UR QL STRIP: NEGATIVE
LEUKOCYTE ESTERASE UR QL STRIP: NEGATIVE
LIPASE SERPL-CCNC: 29 U/L (ref 4–60)
LYMPHOCYTES # BLD AUTO: 3.2 K/UL (ref 1–4.8)
LYMPHOCYTES NFR BLD: 33.2 % (ref 18–48)
MCH RBC QN AUTO: 32.8 PG (ref 27–31)
MCHC RBC AUTO-ENTMCNC: 32.1 G/DL (ref 32–36)
MCV RBC AUTO: 102 FL (ref 82–98)
MICROSCOPIC COMMENT: ABNORMAL
MONOCYTES # BLD AUTO: 0.9 K/UL (ref 0.3–1)
MONOCYTES NFR BLD: 9.1 % (ref 4–15)
NEUTROPHILS # BLD AUTO: 5 K/UL (ref 1.8–7.7)
NEUTROPHILS NFR BLD: 51.1 % (ref 38–73)
NITRITE UR QL STRIP: NEGATIVE
NRBC BLD-RTO: 0 /100 WBC
PH UR STRIP: 6 [PH] (ref 5–8)
PLATELET # BLD AUTO: 279 K/UL (ref 150–450)
PMV BLD AUTO: 10.5 FL (ref 9.2–12.9)
POTASSIUM SERPL-SCNC: 4 MMOL/L (ref 3.5–5.1)
PROT SERPL-MCNC: 6.1 G/DL (ref 6–8.4)
PROT UR QL STRIP: ABNORMAL
RBC # BLD AUTO: 3.57 M/UL (ref 4–5.4)
RBC #/AREA URNS HPF: 4 /HPF (ref 0–4)
SODIUM SERPL-SCNC: 139 MMOL/L (ref 136–145)
SP GR UR STRIP: >1.03 (ref 1–1.03)
SQUAMOUS #/AREA URNS HPF: 40 /HPF
UNIDENT CRYS URNS QL MICRO: 17
URN SPEC COLLECT METH UR: ABNORMAL
UROBILINOGEN UR STRIP-ACNC: ABNORMAL EU/DL
WBC # BLD AUTO: 9.69 K/UL (ref 3.9–12.7)
WBC #/AREA URNS HPF: 4 /HPF (ref 0–5)
WBC CLUMPS URNS QL MICRO: ABNORMAL

## 2024-07-27 PROCEDURE — 96361 HYDRATE IV INFUSION ADD-ON: CPT

## 2024-07-27 PROCEDURE — 99285 EMERGENCY DEPT VISIT HI MDM: CPT | Mod: 25

## 2024-07-27 PROCEDURE — 96374 THER/PROPH/DIAG INJ IV PUSH: CPT

## 2024-07-27 PROCEDURE — 80053 COMPREHEN METABOLIC PANEL: CPT | Performed by: EMERGENCY MEDICINE

## 2024-07-27 PROCEDURE — 85025 COMPLETE CBC W/AUTO DIFF WBC: CPT | Performed by: EMERGENCY MEDICINE

## 2024-07-27 PROCEDURE — 83690 ASSAY OF LIPASE: CPT | Performed by: EMERGENCY MEDICINE

## 2024-07-27 PROCEDURE — 96375 TX/PRO/DX INJ NEW DRUG ADDON: CPT

## 2024-07-27 PROCEDURE — 63600175 PHARM REV CODE 636 W HCPCS: Performed by: EMERGENCY MEDICINE

## 2024-07-27 PROCEDURE — 81001 URINALYSIS AUTO W/SCOPE: CPT | Performed by: EMERGENCY MEDICINE

## 2024-07-27 RX ORDER — PANTOPRAZOLE SODIUM 40 MG/10ML
40 INJECTION, POWDER, LYOPHILIZED, FOR SOLUTION INTRAVENOUS
Status: COMPLETED | OUTPATIENT
Start: 2024-07-27 | End: 2024-07-27

## 2024-07-27 RX ORDER — HYDROMORPHONE HYDROCHLORIDE 1 MG/ML
0.5 INJECTION, SOLUTION INTRAMUSCULAR; INTRAVENOUS; SUBCUTANEOUS
Status: COMPLETED | OUTPATIENT
Start: 2024-07-27 | End: 2024-07-27

## 2024-07-27 RX ORDER — ONDANSETRON HYDROCHLORIDE 2 MG/ML
4 INJECTION, SOLUTION INTRAVENOUS
Status: COMPLETED | OUTPATIENT
Start: 2024-07-27 | End: 2024-07-27

## 2024-07-27 RX ORDER — HYDROCODONE BITARTRATE AND ACETAMINOPHEN 5; 325 MG/1; MG/1
1 TABLET ORAL EVERY 6 HOURS PRN
Qty: 12 TABLET | Refills: 0 | Status: SHIPPED | OUTPATIENT
Start: 2024-07-27

## 2024-07-27 RX ORDER — KETOROLAC TROMETHAMINE 30 MG/ML
15 INJECTION, SOLUTION INTRAMUSCULAR; INTRAVENOUS
Status: COMPLETED | OUTPATIENT
Start: 2024-07-27 | End: 2024-07-27

## 2024-07-27 RX ADMIN — HYDROMORPHONE HYDROCHLORIDE 0.5 MG: 0.5 INJECTION, SOLUTION INTRAMUSCULAR; INTRAVENOUS; SUBCUTANEOUS at 07:07

## 2024-07-27 RX ADMIN — PANTOPRAZOLE SODIUM 40 MG: 40 INJECTION, POWDER, LYOPHILIZED, FOR SOLUTION INTRAVENOUS at 07:07

## 2024-07-27 RX ADMIN — ONDANSETRON 4 MG: 2 INJECTION INTRAMUSCULAR; INTRAVENOUS at 07:07

## 2024-07-27 RX ADMIN — KETOROLAC TROMETHAMINE 15 MG: 30 INJECTION, SOLUTION INTRAMUSCULAR at 09:07

## 2024-07-27 RX ADMIN — SODIUM CHLORIDE, POTASSIUM CHLORIDE, SODIUM LACTATE AND CALCIUM CHLORIDE 1000 ML: 600; 310; 30; 20 INJECTION, SOLUTION INTRAVENOUS at 06:07

## 2024-07-27 NOTE — ED PROVIDER NOTES
Encounter Date: 7/27/2024       History     Chief Complaint   Patient presents with    Abdominal Pain     Patient c/o abdominal pain that began around 2200 last night. Patient denies nausea, vomiting, diarrhea, or constipation.     34-year-old female no significant past medical problems.  Patient presents emergency department with complaint of midepigastric, right upper quadrant abdominal pain that she describes as 7/10 since a proximally 10:00 p.m. last night.  Patient states she has had postprandial pain.  She denies hematemesis, no hematochezia, no fever, no dysuria, no flank or back pain.  Denies history of gastritis.  Denies any other constitutional symptoms.  No ill contacts.  Patient states 1st time she has ever experienced this symptoms.      Review of patient's allergies indicates:  No Known Allergies  No past medical history on file.  No past surgical history on file.  No family history on file.  Social History     Tobacco Use    Smoking status: Every Day     Types: Cigarettes     Review of Systems   Constitutional:  Negative for fever.   HENT:  Negative for sore throat.    Respiratory:  Negative for shortness of breath.    Cardiovascular:  Negative for chest pain.   Gastrointestinal:  Positive for abdominal pain. Negative for constipation, diarrhea, nausea and vomiting.   Genitourinary:  Negative for difficulty urinating, dysuria, flank pain, vaginal bleeding and vaginal discharge.   Musculoskeletal:  Negative for back pain.   Skin:  Negative for rash.   Neurological:  Negative for weakness.   Hematological:  Does not bruise/bleed easily.       Physical Exam     Initial Vitals [07/27/24 0529]   BP Pulse Resp Temp SpO2   130/80 63 18 97.7 °F (36.5 °C) 97 %      MAP       --         Physical Exam    Nursing note and vitals reviewed.  Constitutional: She appears well-developed and well-nourished.   HENT:   Head: Normocephalic and atraumatic.   Nose: Nose normal.   Mouth/Throat: Oropharynx is clear and moist.    Eyes: Conjunctivae and EOM are normal. Pupils are equal, round, and reactive to light.   Neck: Neck supple. No thyromegaly present. No tracheal deviation present.   Normal range of motion.  Cardiovascular:  Normal rate, regular rhythm, normal heart sounds and intact distal pulses.     Exam reveals no gallop and no friction rub.       No murmur heard.  Pulmonary/Chest: No stridor. No respiratory distress.   Course bilateral breath sounds no adventitious sounds   Abdominal: Abdomen is soft. Bowel sounds are normal. She exhibits no mass. There is abdominal tenderness in the right upper quadrant. No hernia. There is positive Farooq's sign. There is no rebound, no guarding and no tenderness at McBurney's point. negative obturator sign, negative psoas sign and negative Rovsing's sign  Musculoskeletal:         General: No edema. Normal range of motion.      Cervical back: Normal range of motion and neck supple.     Lymphadenopathy:     She has no cervical adenopathy.   Neurological: She is alert and oriented to person, place, and time. She has normal strength and normal reflexes. GCS score is 15. GCS eye subscore is 4. GCS verbal subscore is 5. GCS motor subscore is 6.   Skin: Skin is warm and dry. Capillary refill takes less than 2 seconds.   Psychiatric: She has a normal mood and affect.         ED Course   Procedures  Labs Reviewed   CBC W/ AUTO DIFFERENTIAL - Abnormal       Result Value    WBC 9.69      RBC 3.57 (*)     Hemoglobin 11.7 (*)     Hematocrit 36.4 (*)      (*)     MCH 32.8 (*)     MCHC 32.1      RDW 15.3 (*)     Platelets 279      MPV 10.5      Immature Granulocytes 0.3      Gran # (ANC) 5.0      Immature Grans (Abs) 0.03      Lymph # 3.2      Mono # 0.9      Eos # 0.5      Baso # 0.07      nRBC 0      Gran % 51.1      Lymph % 33.2      Mono % 9.1      Eosinophil % 5.6      Basophil % 0.7      Differential Method Automated     COMPREHENSIVE METABOLIC PANEL - Abnormal    Sodium 139      Potassium  4.0      Chloride 107      CO2 29      Glucose 100      BUN 8      Creatinine 0.8      Calcium 8.4 (*)     Total Protein 6.1      Albumin 3.6      Total Bilirubin 0.3      Alkaline Phosphatase 43 (*)     AST 11      ALT 9 (*)     eGFR >60.0      Anion Gap 3 (*)    URINALYSIS, REFLEX TO URINE CULTURE - Abnormal    Specimen UA Urine, Clean Catch      Color, UA Yellow      Appearance, UA Hazy (*)     pH, UA 6.0      Specific Gravity, UA >1.030 (*)     Protein, UA 1+ (*)     Glucose, UA Negative      Ketones, UA Negative      Bilirubin (UA) Negative      Occult Blood UA TRACE      Nitrite, UA Negative      Urobilinogen, UA 4.0-6.0 (*)     Leukocytes, UA Negative      Narrative:     In and Out Cath as needed it patient unable to void  Specimen Source->Urine   LIPASE    Lipase 29     URINALYSIS MICROSCOPIC    RBC, UA 4      WBC, UA 4      Bacteria None      Squam Epithel, UA 40      Hyaline Casts, UA 0      Ca Oxalate Lelia, UA Few      Microscopic Comment SEE COMMENT      Narrative:     In and Out Cath as needed it patient unable to void  Specimen Source->Urine   POCT URINE PREGNANCY          Imaging Results              US Abdomen Limited (Final result)  Result time 07/27/24 07:23:20      Final result by Julio Swenson MD (07/27/24 07:23:20)                   Impression:      1.  Cholelithiasis without acute cholecystitis.    2.  Hepatomegaly.    3.  Top-normal size CBD with no intrahepatic biliary ductal dilation.    .      Electronically signed by: Julio Swenson  Date:    07/27/2024  Time:    07:23               Narrative:    CLINICAL HISTORY:  ([id number])35 y/o  (1990) F    abdominal pain;    TECHNIQUE:  (A#[id number], exam time 7/27/2024 7:18)    US ABDOMEN LIMITED [id number]    RUQ ultrasound, images obtained in grayscale and color. Additional Doppler images of the portal vein were obtained.    COMPARISON:  CT from 01/07/2023.    FINDINGS:  The LIVER is enlarged measuring 19.4 cm (sagittal right lobe).  Hepatic parenchyma has normal echogenicity with normal delineation of the periportal triads. No definite intrahepatic masses are noted. The portal vein is patent with hepatopetal flow .    The BILIARY SYSTEM is normal in caliber; the common hepatic duct is top normal measuring 6 mm. There are is a stone measuring 2 cm seen in the GALL BLADDER. There is no pericholecystic fluid, gallbladder wall thickening or sonographic Farooq sign to suggest acute cholecystitis.    The PANCREATIC head and body are partially visualized but grossly normal in appearance. The pancreatic duct is not dilated.    The right KIDNEY is normal in size at 11.2 x 4.3 x 4.3 cm and echogenicity/texture. No stones or hydronephrosis seen. No solid masses are noted .    The AORTA and IVC are partially visualized and unremarkable.                                       Medications   lactated ringers bolus 1,000 mL (1,000 mLs Intravenous New Bag 7/27/24 0630)   pantoprazole injection 40 mg (40 mg Intravenous Given 7/27/24 0743)   HYDROmorphone injection 0.5 mg (0.5 mg Intravenous Given 7/27/24 0743)   ondansetron injection 4 mg (4 mg Intravenous Given 7/27/24 0743)     Medical Decision Making  Amount and/or Complexity of Data Reviewed  Labs: ordered.  Radiology: ordered.    Risk  Prescription drug management.                          Medical Decision Making:   Initial Assessment:   34-year-old female no significant past medical problems.  Patient presents emergency department with complaint of midepigastric, right upper quadrant abdominal pain that she describes as 7/10 since a proximally 10:00 p.m. last night.  Patient states she has had postprandial pain.  She denies hematemesis, no hematochezia, no fever, no dysuria, no flank or back pain.  Denies history of gastritis.  Denies any other constitutional symptoms.  No ill contacts.  Patient states 1st time she has ever experienced this symptoms.    Differential Diagnosis:   Cholelithiasis,  pancreatitis, colitis, gastroenteritis  Clinical Tests:   Lab Tests: Ordered and Reviewed  Radiological Study: Ordered and Reviewed  ED Management:  Patient seen evaluated emergency department.  Patient found with right upper quadrant abdominal pain on examination with mild Farooq's sign.  Ultrasound right upper quadrant revealed nonobstructing 2 cm calculus and gallbladder.  No evidence of cholecystitis noted.  With normal common bile duct.  No transaminitis noted on labs.  Patient did receive IV hydration pain control in emergency department.  Currently at this time will be discharged with close surgical follow-up for cholelithiasis and biliary colic.  Patient given detailed instructions to return if fever, increased abdominal pain, problems persist worsens or additional concerns.  Also given diet-controlled parameters.             Clinical Impression:  Final diagnoses:  [K80.20] Calculus of gallbladder without cholecystitis without obstruction (Primary)  [K80.50] Biliary colic                 Jacob Serrano MD  07/27/24 0901

## 2024-10-21 ENCOUNTER — HOSPITAL ENCOUNTER (EMERGENCY)
Facility: HOSPITAL | Age: 34
Discharge: HOME | End: 2024-10-21
Attending: STUDENT IN AN ORGANIZED HEALTH CARE EDUCATION/TRAINING PROGRAM
Payer: MEDICAID

## 2024-10-21 VITALS
DIASTOLIC BLOOD PRESSURE: 65 MMHG | BODY MASS INDEX: 36.1 KG/M2 | RESPIRATION RATE: 16 BRPM | WEIGHT: 230 LBS | HEIGHT: 67 IN | SYSTOLIC BLOOD PRESSURE: 111 MMHG | TEMPERATURE: 98 F | HEART RATE: 52 BPM | OXYGEN SATURATION: 99 %

## 2024-10-21 DIAGNOSIS — K80.20 CALCULUS OF GALLBLADDER WITHOUT CHOLECYSTITIS WITHOUT OBSTRUCTION: ICD-10-CM

## 2024-10-21 DIAGNOSIS — R10.11 RUQ PAIN: Primary | ICD-10-CM

## 2024-10-21 LAB
ALBUMIN SERPL BCP-MCNC: 4 G/DL (ref 3.5–5.2)
ALP SERPL-CCNC: 48 U/L (ref 55–135)
ALT SERPL W/O P-5'-P-CCNC: 11 U/L (ref 10–44)
ANION GAP SERPL CALC-SCNC: 4 MMOL/L (ref 8–16)
AST SERPL-CCNC: 12 U/L (ref 10–40)
B-HCG UR QL: NEGATIVE
BASOPHILS # BLD AUTO: 0.08 K/UL (ref 0–0.2)
BASOPHILS NFR BLD: 0.5 % (ref 0–1.9)
BILIRUB SERPL-MCNC: 0.3 MG/DL (ref 0.1–1)
BILIRUB UR QL STRIP: NEGATIVE
BUN SERPL-MCNC: 10 MG/DL (ref 6–20)
CALCIUM SERPL-MCNC: 8.8 MG/DL (ref 8.7–10.5)
CHLORIDE SERPL-SCNC: 105 MMOL/L (ref 95–110)
CLARITY UR: ABNORMAL
CO2 SERPL-SCNC: 27 MMOL/L (ref 23–29)
COLOR UR: YELLOW
CREAT SERPL-MCNC: 0.7 MG/DL (ref 0.5–1.4)
CTP QC/QA: YES
DIFFERENTIAL METHOD BLD: ABNORMAL
EOSINOPHIL # BLD AUTO: 0.3 K/UL (ref 0–0.5)
EOSINOPHIL NFR BLD: 2 % (ref 0–8)
ERYTHROCYTE [DISTWIDTH] IN BLOOD BY AUTOMATED COUNT: 13.7 % (ref 11.5–14.5)
EST. GFR  (NO RACE VARIABLE): >60 ML/MIN/1.73 M^2
GLUCOSE SERPL-MCNC: 92 MG/DL (ref 70–110)
GLUCOSE UR QL STRIP: NEGATIVE
HCT VFR BLD AUTO: 38.2 % (ref 37–48.5)
HGB BLD-MCNC: 12.4 G/DL (ref 12–16)
HGB UR QL STRIP: NEGATIVE
IMM GRANULOCYTES # BLD AUTO: 0.07 K/UL (ref 0–0.04)
IMM GRANULOCYTES NFR BLD AUTO: 0.5 % (ref 0–0.5)
KETONES UR QL STRIP: NEGATIVE
LEUKOCYTE ESTERASE UR QL STRIP: NEGATIVE
LIPASE SERPL-CCNC: 36 U/L (ref 4–60)
LYMPHOCYTES # BLD AUTO: 3.3 K/UL (ref 1–4.8)
LYMPHOCYTES NFR BLD: 21.4 % (ref 18–48)
MCH RBC QN AUTO: 32.5 PG (ref 27–31)
MCHC RBC AUTO-ENTMCNC: 32.5 G/DL (ref 32–36)
MCV RBC AUTO: 100 FL (ref 82–98)
MONOCYTES # BLD AUTO: 0.9 K/UL (ref 0.3–1)
MONOCYTES NFR BLD: 5.8 % (ref 4–15)
NEUTROPHILS # BLD AUTO: 10.7 K/UL (ref 1.8–7.7)
NEUTROPHILS NFR BLD: 69.8 % (ref 38–73)
NITRITE UR QL STRIP: NEGATIVE
NRBC BLD-RTO: 0 /100 WBC
PH UR STRIP: 7 [PH] (ref 5–8)
PLATELET # BLD AUTO: 323 K/UL (ref 150–450)
PMV BLD AUTO: 10.8 FL (ref 9.2–12.9)
POTASSIUM SERPL-SCNC: 3.5 MMOL/L (ref 3.5–5.1)
PROT SERPL-MCNC: 6.5 G/DL (ref 6–8.4)
PROT UR QL STRIP: ABNORMAL
RBC # BLD AUTO: 3.82 M/UL (ref 4–5.4)
SODIUM SERPL-SCNC: 136 MMOL/L (ref 136–145)
SP GR UR STRIP: 1.03 (ref 1–1.03)
URN SPEC COLLECT METH UR: ABNORMAL
UROBILINOGEN UR STRIP-ACNC: NEGATIVE EU/DL
WBC # BLD AUTO: 15.26 K/UL (ref 3.9–12.7)

## 2024-10-21 PROCEDURE — 81003 URINALYSIS AUTO W/O SCOPE: CPT | Performed by: STUDENT IN AN ORGANIZED HEALTH CARE EDUCATION/TRAINING PROGRAM

## 2024-10-21 PROCEDURE — 85025 COMPLETE CBC W/AUTO DIFF WBC: CPT | Performed by: STUDENT IN AN ORGANIZED HEALTH CARE EDUCATION/TRAINING PROGRAM

## 2024-10-21 PROCEDURE — 63600175 PHARM REV CODE 636 W HCPCS: Performed by: STUDENT IN AN ORGANIZED HEALTH CARE EDUCATION/TRAINING PROGRAM

## 2024-10-21 PROCEDURE — 99285 EMERGENCY DEPT VISIT HI MDM: CPT | Mod: 25

## 2024-10-21 PROCEDURE — 96375 TX/PRO/DX INJ NEW DRUG ADDON: CPT

## 2024-10-21 PROCEDURE — 83690 ASSAY OF LIPASE: CPT | Performed by: STUDENT IN AN ORGANIZED HEALTH CARE EDUCATION/TRAINING PROGRAM

## 2024-10-21 PROCEDURE — 80053 COMPREHEN METABOLIC PANEL: CPT | Performed by: STUDENT IN AN ORGANIZED HEALTH CARE EDUCATION/TRAINING PROGRAM

## 2024-10-21 PROCEDURE — 25000003 PHARM REV CODE 250: Performed by: STUDENT IN AN ORGANIZED HEALTH CARE EDUCATION/TRAINING PROGRAM

## 2024-10-21 PROCEDURE — 96374 THER/PROPH/DIAG INJ IV PUSH: CPT

## 2024-10-21 PROCEDURE — 81025 URINE PREGNANCY TEST: CPT | Performed by: STUDENT IN AN ORGANIZED HEALTH CARE EDUCATION/TRAINING PROGRAM

## 2024-10-21 RX ORDER — ALUMINUM HYDROXIDE, MAGNESIUM HYDROXIDE, AND SIMETHICONE 1200; 120; 1200 MG/30ML; MG/30ML; MG/30ML
30 SUSPENSION ORAL ONCE
Status: COMPLETED | OUTPATIENT
Start: 2024-10-21 | End: 2024-10-21

## 2024-10-21 RX ORDER — HYDROMORPHONE HYDROCHLORIDE 1 MG/ML
0.5 INJECTION, SOLUTION INTRAMUSCULAR; INTRAVENOUS; SUBCUTANEOUS
Status: COMPLETED | OUTPATIENT
Start: 2024-10-21 | End: 2024-10-21

## 2024-10-21 RX ORDER — FAMOTIDINE 10 MG/ML
20 INJECTION INTRAVENOUS
Status: COMPLETED | OUTPATIENT
Start: 2024-10-21 | End: 2024-10-21

## 2024-10-21 RX ORDER — ONDANSETRON HYDROCHLORIDE 2 MG/ML
4 INJECTION, SOLUTION INTRAVENOUS
Status: COMPLETED | OUTPATIENT
Start: 2024-10-21 | End: 2024-10-21

## 2024-10-21 RX ADMIN — FAMOTIDINE 20 MG: 10 INJECTION, SOLUTION INTRAVENOUS at 05:10

## 2024-10-21 RX ADMIN — ALUMINUM HYDROXIDE, MAGNESIUM HYDROXIDE, AND SIMETHICONE 30 ML: 200; 200; 20 SUSPENSION ORAL at 05:10

## 2024-10-21 RX ADMIN — HYDROMORPHONE HYDROCHLORIDE 0.5 MG: 0.5 INJECTION, SOLUTION INTRAMUSCULAR; INTRAVENOUS; SUBCUTANEOUS at 08:10

## 2024-10-21 RX ADMIN — ONDANSETRON 4 MG: 2 INJECTION INTRAMUSCULAR; INTRAVENOUS at 05:10

## 2024-10-22 NOTE — DISCHARGE INSTRUCTIONS
You were seen for a gallstone.  You did not have evidence of a gallbladder infection.  You have been referred to General surgery and GI.      Please return to this facility or another ED as needed for any new or worsening symptoms including chest pain, shortness of breath, abdominal pain, nausea or vomiting, fever or chills. Please follow up with your primary care doctor in 3 days. Please take all medicines as prescribed.

## 2024-10-23 NOTE — ED PROVIDER NOTES
Encounter Date: 10/21/2024       History     Chief Complaint   Patient presents with    Abdominal Pain     Since last night, denies diarrhea or vomiting, RUQ pain, states she was told she has gall stones in the past     HPI  34-year-old woman with no reported past medical history presents for evaluation of right upper quadrant abdominal pain that has been crampy since last night, she describes it as sharp, no obvious exacerbating or relieving factors, she read denies diarrhea or vomiting, she reports some nausea.  She says this feels similar to prior when she was diagnosed with a gallstone.  She denies fever or chills.  She denies chest pain shortness of bled better or bladder issues.  Denies a history of abdominal surgery.  She does not drink alcohol regularly, she does report smoking marijuana regularly    She said her pain was worse just prior to EMS  Review of patient's allergies indicates:  No Known Allergies  History reviewed. No pertinent past medical history.  History reviewed. No pertinent surgical history.  No family history on file.  Social History     Tobacco Use    Smoking status: Every Day     Types: Cigarettes, Vaping with nicotine    Smokeless tobacco: Never    Tobacco comments:     About 5 cigs a day   Substance Use Topics    Alcohol use: Not Currently    Drug use: Yes     Types: Marijuana     Review of Systems   All other systems reviewed and are negative.      Physical Exam     Initial Vitals [10/21/24 1647]   BP Pulse Resp Temp SpO2   121/61 66 20 98.4 °F (36.9 °C) 98 %      MAP       --         Physical Exam    Nursing note and vitals reviewed.  Constitutional: She appears well-developed. She is not diaphoretic.   HENT:   Head: Normocephalic and atraumatic.   Eyes: Right eye exhibits no discharge. Left eye exhibits no discharge.   Neck: No tracheal deviation present.   Cardiovascular:  Normal rate, regular rhythm and intact distal pulses.           Pulmonary/Chest: Breath sounds normal. No  stridor. No respiratory distress.   Abdominal: Abdomen is soft. There is no abdominal tenderness. There is no rebound.   Musculoskeletal:         General: No tenderness.     Neurological: She is alert and oriented to person, place, and time.   Skin: Skin is warm and dry.      for arrival, they gave her Toradol and Zofran and she is pain free on my evaluation    ED Course   Procedures  Labs Reviewed   CBC W/ AUTO DIFFERENTIAL - Abnormal       Result Value    WBC 15.26 (*)     RBC 3.82 (*)     Hemoglobin 12.4      Hematocrit 38.2       (*)     MCH 32.5 (*)     MCHC 32.5      RDW 13.7      Platelets 323      MPV 10.8      Immature Granulocytes 0.5      Gran # (ANC) 10.7 (*)     Immature Grans (Abs) 0.07 (*)     Lymph # 3.3      Mono # 0.9      Eos # 0.3      Baso # 0.08      nRBC 0      Gran % 69.8      Lymph % 21.4      Mono % 5.8      Eosinophil % 2.0      Basophil % 0.5      Differential Method Automated     COMPREHENSIVE METABOLIC PANEL - Abnormal    Sodium 136      Potassium 3.5      Chloride 105      CO2 27      Glucose 92      BUN 10      Creatinine 0.7      Calcium 8.8      Total Protein 6.5      Albumin 4.0      Total Bilirubin 0.3      Alkaline Phosphatase 48 (*)     AST 12      ALT 11      eGFR >60.0      Anion Gap 4 (*)    URINALYSIS, REFLEX TO URINE CULTURE - Abnormal    Specimen UA Urine, Clean Catch      Color, UA Yellow      Appearance, UA Hazy (*)     pH, UA 7.0      Specific Gravity, UA 1.030      Protein, UA Trace (*)     Glucose, UA Negative      Ketones, UA Negative      Bilirubin (UA) Negative      Occult Blood UA Negative      Nitrite, UA Negative      Urobilinogen, UA Negative      Leukocytes, UA Negative      Narrative:     Specimen Source->Urine   LIPASE    Lipase 36     POCT URINE PREGNANCY    POC Preg Test, Ur Negative       Acceptable Yes            Imaging Results              MRI MRCP Abdomen WO Contrast 3D WO Independent WS XPD (Final result)  Result time 10/21/24  19:26:12      Final result by Goyo Gallego MD (10/21/24 19:26:12)                   Impression:      Cholelithiasis without convincing MRI evidence to suggest acute cholecystitis at this time.    Mildly dilated extrahepatic common bile duct measuring up to 8-9 mm with relatively gradual, smooth tapering to a more normal caliber distally.  No definite intraluminal filling defect to suggest choledocholithiasis identified.  No significant intrahepatic biliary ductal dilatation.  Further evaluation and follow-up as warranted.    Hepatomegaly.      Electronically signed by: Goyo Gallego MD  Date:    10/21/2024  Time:    19:26               Narrative:    EXAMINATION:  MRI MRCP ABDOMEN WO CONTRAST 3D WO INDEPENDENT WS XPD    CLINICAL HISTORY:  Biliary cyst;    TECHNIQUE:  Multiplanar, multisequence images are performed through the liver and upper abdomen.  Additionally 2D and 3D MRCP sequences are performed as well as MIP images.    COMPARISON:  Ultrasound 10/21/2024, CT 01/07/2023    FINDINGS:  The visualized lung bases are unremarkable.  No significant pericardial effusion.    The liver is enlarged measuring 21.1 cm.  No focal hepatic abnormality appreciated on this limited noncontrast examination.    There are multiple calculi within the gallbladder lumen measuring up to 1.6 cm.  The gallbladder is not significantly distended.  No significant pericholecystic inflammatory change appreciated.  Gallbladder wall measures 2-3 mm in thickness.  The proximal extrahepatic common bile duct measures 8-9 mm.  There is relatively gradual, smooth tapering of the more distal extrahepatic common bile duct measuring up to 5-mm.  No discrete intraluminal filling defect appreciated.  No significant intrahepatic biliary ductal dilatation identified.    No discrete pancreatic abnormality identified on this limited noncontrast study.  The main pancreatic duct is normal in caliber.  Spleen appears within normal limits.  The adrenal  glands are unremarkable, noting slight thickening of the left adrenal gland.  There is no evidence of hydronephrosis.  Abdominal aorta is normal in course and caliber.  Stomach is not significantly distended.  Visualized loops of small and large bowel grossly unremarkable.  No convincing evidence of diffuse bone marrow replacement process.                                       US Abdomen Limited (Final result)  Result time 10/21/24 17:53:47      Final result by Lv Beasley MD (10/21/24 17:53:47)                   Impression:      1. Cholelithiasis.  2. Dilatation of the common bile duct up to 9-10 mm in size at the level of the pancreatic head, larger in size than at the time of the prior study.  No obvious choledocholithiasis or pancreatic head mass appreciated.  If there is a clinical concern of acute biliary obstruction, consideration should be given to performing MRCP.  3. Hepatomegaly      Electronically signed by: Delmer Beasley MD  Date:    10/21/2024  Time:    17:53               Narrative:    EXAMINATION:  US ABDOMEN LIMITED    CLINICAL HISTORY:  Abdominal Pain - Gallbladder;.    TECHNIQUE:  Limited ultrasound evaluation of the abdomen (including the pancreas,IVC, liver, gallbladder, common bile duct, and spleen) was performed utilizing grayscale and color flow imaging.    COMPARISON:  Abdominal ultrasound-07/27/2024    FINDINGS:  Pancreas: The pancreas is normal in echogenicity without focal mass or definite pseudocyst where visualized.    Liver:The liver is enlarged in size measuring 17.8 cm in sagittal dimension.  The liver demonstrates a normal homogeneous parenchymal echotexture without definite fatty infiltration.  No solid hepatic masses observed. The portal vein is patent and shows normal directional flow.The IVC is patent where visualized.    Biliary system:  There are multiple mobile shadowing gallstones present within the gallbladder lumen.  No sonographic evidence of gallbladder  inflammatory change.  No sonographic Farooq sign identified.The common bile duct is enlarged, measuring up to 9-10 mm in the vicinity of the pancreatic head, larger than at the time of the prior study.  No intrahepatic biliary ductal dilatation.    Right kidney: The right kidney is normal in sagittal dimension measuring 11.2 cm.  There is mild diffuse cortical parenchymal thinning in the right kidney.  No stones, hydronephrosis, or solid right renal mass identified    Miscellaneous: No ascites.                                       Medications   famotidine (PF) injection 20 mg (20 mg Intravenous Given 10/21/24 1708)   ondansetron injection 4 mg (4 mg Intravenous Given 10/21/24 1709)   aluminum-magnesium hydroxide-simethicone 200-200-20 mg/5 mL suspension 30 mL (30 mLs Oral Given 10/21/24 1708)   HYDROmorphone injection 0.5 mg (0.5 mg Intravenous Given 10/21/24 2046)     Medical Decision Making  Right upper quadrant abdominal pain nausea without vomiting pain much improved on arrival her vitals are within normal limits or heart rates in the 50s, on exam she is very well-appearing, her abdomen is soft and nontender, differential includes biliary colic metabolic or endocrine derangement cholecystitis choledocholithiasis.  I do not think this is an atypical ACS picture.  Got an ultrasound which shows gallstone without evidence of cholecystitis.  She does have a dilated CBD, she got an MRCP.  With her reassuring MRCP her reassuring labs normal vital signs her very mild pain, I think it is reasonable to make a disposition decision based on symptom control.  She does not need emergent evaluation by General surgery GI. No evidence of choledocholithiasis, she does not have laboratory findings consistent with cholangitis or obstruction.  She is not febrile.  Her pain got a little bit worse her abdomen remained soft and nontender.  I gave her some additional pain medicine.  I re-evaluated her.  She tolerated p.o..  We  discussed options including admission for continued monitoring and additional pain medicine.  She is feeling better and she is ready to go home.  Repeat abdominal exam soft and nontender, discharged with Lisa surge and GI follow up.    Amount and/or Complexity of Data Reviewed  Independent Historian: EMS     Details: They gave 15 of Toradol  External Data Reviewed: notes.  Labs: ordered.  Radiology: ordered and independent interpretation performed.     Details: Independent interpretation of her gallbladder ultrasound I appreciate a radiopaque stone    Risk  OTC drugs.  Prescription drug management.  Parenteral controlled substances.  Decision regarding hospitalization.               ED Course as of 10/22/24 2017   Mon Oct 21, 2024   2220 She feels better, she is ready to go home, repeat abdominal exam soft and nontender [IC]      ED Course User Index  [IC] Hema Porter MD                           Clinical Impression:  Final diagnoses:  [R10.11] RUQ pain (Primary)  [K80.20] Calculus of gallbladder without cholecystitis without obstruction          ED Disposition Condition    Discharge Stable          ED Prescriptions    None       Follow-up Information       Follow up With Specialties Details Why Contact Info Additional Information    [facility] - Emergency Dept Emergency Medicine Go to  As needed, If symptoms worsen [address] [facility] [facility] [place] [id number] [phone] 1st floor    Donovan Phillip MD General Surgery, Colon and Rectal Surgery, Surgery Schedule an appointment as soon as possible for a visit in 1 week  [address]  [address]  [facility] [address]  [phone]       Kyle Stern MD Gastroenterology Schedule an appointment as soon as possible for a visit in 1 week  [address] [place] [address]  [phone]                Hema Porter MD  10/22/24 2017

## 2025-01-27 ENCOUNTER — HOSPITAL ENCOUNTER (INPATIENT)
Facility: HOSPITAL | Age: 35
LOS: 4 days | Discharge: HOME | DRG: 418 | End: 2025-01-31
Attending: EMERGENCY MEDICINE | Admitting: FAMILY MEDICINE
Payer: MEDICAID

## 2025-01-27 DIAGNOSIS — R00.1 BRADYCARDIA: ICD-10-CM

## 2025-01-27 DIAGNOSIS — I49.9 ARRHYTHMIA: ICD-10-CM

## 2025-01-27 DIAGNOSIS — R10.9 ABDOMINAL PAIN: ICD-10-CM

## 2025-01-27 DIAGNOSIS — R07.9 CHEST PAIN: ICD-10-CM

## 2025-01-27 DIAGNOSIS — K80.00 CALCULUS OF GALLBLADDER WITH ACUTE CHOLECYSTITIS WITHOUT OBSTRUCTION: Primary | ICD-10-CM

## 2025-01-27 DIAGNOSIS — R10.11 RIGHT UPPER QUADRANT PAIN: ICD-10-CM

## 2025-01-27 PROCEDURE — 81025 URINE PREGNANCY TEST: CPT | Performed by: EMERGENCY MEDICINE

## 2025-01-27 PROCEDURE — 99285 EMERGENCY DEPT VISIT HI MDM: CPT

## 2025-01-27 PROCEDURE — 12000002 HC ACUTE/MED SURGE SEMI-PRIVATE ROOM

## 2025-01-27 RX ORDER — ONDANSETRON HYDROCHLORIDE 2 MG/ML
4 INJECTION, SOLUTION INTRAVENOUS
Status: COMPLETED | OUTPATIENT
Start: 2025-01-27 | End: 2025-01-28

## 2025-01-28 ENCOUNTER — ANESTHESIA (OUTPATIENT)
Dept: SURGERY | Facility: HOSPITAL | Age: 35
DRG: 418 | End: 2025-01-28
Payer: MEDICAID

## 2025-01-28 ENCOUNTER — ANESTHESIA EVENT (OUTPATIENT)
Dept: SURGERY | Facility: HOSPITAL | Age: 35
DRG: 418 | End: 2025-01-28
Payer: MEDICAID

## 2025-01-28 PROBLEM — K80.00 CHOLELITHIASIS WITH ACUTE CHOLECYSTITIS: Status: ACTIVE | Noted: 2025-01-28

## 2025-01-28 LAB
ALBUMIN SERPL BCP-MCNC: 4.5 G/DL (ref 3.5–5.2)
ALP SERPL-CCNC: 53 U/L (ref 55–135)
ALT SERPL W/O P-5'-P-CCNC: 22 U/L (ref 10–44)
ANION GAP SERPL CALC-SCNC: 6 MMOL/L (ref 8–16)
AST SERPL-CCNC: 41 U/L (ref 10–40)
B-HCG UR QL: NEGATIVE
BACTERIA #/AREA URNS HPF: ABNORMAL /HPF
BASOPHILS # BLD AUTO: 0.06 K/UL (ref 0–0.2)
BASOPHILS NFR BLD: 0.3 % (ref 0–1.9)
BILIRUB SERPL-MCNC: 0.7 MG/DL (ref 0.1–1)
BILIRUB UR QL STRIP: ABNORMAL
BUN SERPL-MCNC: 8 MG/DL (ref 6–20)
CALCIUM SERPL-MCNC: 10.1 MG/DL (ref 8.7–10.5)
CHLORIDE SERPL-SCNC: 103 MMOL/L (ref 95–110)
CLARITY UR: ABNORMAL
CO2 SERPL-SCNC: 29 MMOL/L (ref 23–29)
COLOR UR: ABNORMAL
CREAT SERPL-MCNC: 0.8 MG/DL (ref 0.5–1.4)
CTP QC/QA: YES
DIFFERENTIAL METHOD BLD: ABNORMAL
EOSINOPHIL # BLD AUTO: 0.1 K/UL (ref 0–0.5)
EOSINOPHIL NFR BLD: 0.4 % (ref 0–8)
ERYTHROCYTE [DISTWIDTH] IN BLOOD BY AUTOMATED COUNT: 13.2 % (ref 11.5–14.5)
EST. GFR  (NO RACE VARIABLE): >60 ML/MIN/1.73 M^2
GLUCOSE SERPL-MCNC: 110 MG/DL (ref 70–110)
GLUCOSE UR QL STRIP: ABNORMAL
HCG INTACT+B SERPL-ACNC: <0.6 MIU/ML
HCT VFR BLD AUTO: 39.6 % (ref 37–48.5)
HGB BLD-MCNC: 13 G/DL (ref 12–16)
HGB UR QL STRIP: ABNORMAL
HYALINE CASTS #/AREA URNS LPF: 0 /LPF
IMM GRANULOCYTES # BLD AUTO: 0.06 K/UL (ref 0–0.04)
IMM GRANULOCYTES NFR BLD AUTO: 0.3 % (ref 0–0.5)
KETONES UR QL STRIP: ABNORMAL
LEUKOCYTE ESTERASE UR QL STRIP: ABNORMAL
LIPASE SERPL-CCNC: 25 U/L (ref 4–60)
LYMPHOCYTES # BLD AUTO: 2 K/UL (ref 1–4.8)
LYMPHOCYTES NFR BLD: 10.9 % (ref 18–48)
MCH RBC QN AUTO: 32.7 PG (ref 27–31)
MCHC RBC AUTO-ENTMCNC: 32.8 G/DL (ref 32–36)
MCV RBC AUTO: 100 FL (ref 82–98)
MICROSCOPIC COMMENT: ABNORMAL
MONOCYTES # BLD AUTO: 1 K/UL (ref 0.3–1)
MONOCYTES NFR BLD: 5.4 % (ref 4–15)
NEUTROPHILS # BLD AUTO: 15 K/UL (ref 1.8–7.7)
NEUTROPHILS NFR BLD: 82.7 % (ref 38–73)
NITRITE UR QL STRIP: NEGATIVE
NRBC BLD-RTO: 0 /100 WBC
PH UR STRIP: 6 [PH] (ref 5–8)
PLATELET # BLD AUTO: 328 K/UL (ref 150–450)
PMV BLD AUTO: 11 FL (ref 9.2–12.9)
POTASSIUM SERPL-SCNC: 4.2 MMOL/L (ref 3.5–5.1)
PROT SERPL-MCNC: 7.4 G/DL (ref 6–8.4)
PROT UR QL STRIP: ABNORMAL
RBC # BLD AUTO: 3.97 M/UL (ref 4–5.4)
RBC #/AREA URNS HPF: 31 /HPF (ref 0–4)
SODIUM SERPL-SCNC: 138 MMOL/L (ref 136–145)
SP GR UR STRIP: >1.03 (ref 1–1.03)
SQUAMOUS #/AREA URNS HPF: >100 /HPF
URN SPEC COLLECT METH UR: ABNORMAL
UROBILINOGEN UR STRIP-ACNC: ABNORMAL EU/DL
WBC # BLD AUTO: 18.19 K/UL (ref 3.9–12.7)
WBC #/AREA URNS HPF: 13 /HPF (ref 0–5)

## 2025-01-28 PROCEDURE — 43259 EGD US EXAM DUODENUM/JEJUNUM: CPT | Performed by: INTERNAL MEDICINE

## 2025-01-28 PROCEDURE — 93005 ELECTROCARDIOGRAM TRACING: CPT | Performed by: INTERNAL MEDICINE

## 2025-01-28 PROCEDURE — 99222 1ST HOSP IP/OBS MODERATE 55: CPT | Mod: 57,,, | Performed by: SURGERY

## 2025-01-28 PROCEDURE — 63600175 PHARM REV CODE 636 W HCPCS: Performed by: INTERNAL MEDICINE

## 2025-01-28 PROCEDURE — 63600175 PHARM REV CODE 636 W HCPCS

## 2025-01-28 PROCEDURE — G0378 HOSPITAL OBSERVATION PER HR: HCPCS

## 2025-01-28 PROCEDURE — 96375 TX/PRO/DX INJ NEW DRUG ADDON: CPT

## 2025-01-28 PROCEDURE — 81001 URINALYSIS AUTO W/SCOPE: CPT | Performed by: EMERGENCY MEDICINE

## 2025-01-28 PROCEDURE — 94761 N-INVAS EAR/PLS OXIMETRY MLT: CPT

## 2025-01-28 PROCEDURE — 63600175 PHARM REV CODE 636 W HCPCS: Performed by: SURGERY

## 2025-01-28 PROCEDURE — 25000003 PHARM REV CODE 250: Performed by: INTERNAL MEDICINE

## 2025-01-28 PROCEDURE — 25000003 PHARM REV CODE 250

## 2025-01-28 PROCEDURE — 63600175 PHARM REV CODE 636 W HCPCS: Performed by: EMERGENCY MEDICINE

## 2025-01-28 PROCEDURE — 84702 CHORIONIC GONADOTROPIN TEST: CPT | Performed by: EMERGENCY MEDICINE

## 2025-01-28 PROCEDURE — 63600175 PHARM REV CODE 636 W HCPCS: Performed by: NURSE PRACTITIONER

## 2025-01-28 PROCEDURE — 0FJD8ZZ INSPECTION OF PANCREATIC DUCT, VIA NATURAL OR ARTIFICIAL OPENING ENDOSCOPIC: ICD-10-PCS | Performed by: RADIOLOGY

## 2025-01-28 PROCEDURE — 12000002 HC ACUTE/MED SURGE SEMI-PRIVATE ROOM

## 2025-01-28 PROCEDURE — D9220A PRA ANESTHESIA: Mod: CRNA,,, | Performed by: NURSE ANESTHETIST, CERTIFIED REGISTERED

## 2025-01-28 PROCEDURE — 87086 URINE CULTURE/COLONY COUNT: CPT | Performed by: EMERGENCY MEDICINE

## 2025-01-28 PROCEDURE — 80053 COMPREHEN METABOLIC PANEL: CPT | Performed by: EMERGENCY MEDICINE

## 2025-01-28 PROCEDURE — 25000003 PHARM REV CODE 250: Performed by: SURGERY

## 2025-01-28 PROCEDURE — 0FJB8ZZ INSPECTION OF HEPATOBILIARY DUCT, VIA NATURAL OR ARTIFICIAL OPENING ENDOSCOPIC: ICD-10-PCS | Performed by: RADIOLOGY

## 2025-01-28 PROCEDURE — 93010 ELECTROCARDIOGRAM REPORT: CPT | Mod: ,,, | Performed by: INTERNAL MEDICINE

## 2025-01-28 PROCEDURE — 96372 THER/PROPH/DIAG INJ SC/IM: CPT

## 2025-01-28 PROCEDURE — 63600175 PHARM REV CODE 636 W HCPCS: Performed by: NURSE ANESTHETIST, CERTIFIED REGISTERED

## 2025-01-28 PROCEDURE — 25000003 PHARM REV CODE 250: Performed by: NURSE ANESTHETIST, CERTIFIED REGISTERED

## 2025-01-28 PROCEDURE — 37000008 HC ANESTHESIA 1ST 15 MINUTES: Performed by: INTERNAL MEDICINE

## 2025-01-28 PROCEDURE — D9220A PRA ANESTHESIA: Mod: ANES,,, | Performed by: ANESTHESIOLOGY

## 2025-01-28 PROCEDURE — 96365 THER/PROPH/DIAG IV INF INIT: CPT

## 2025-01-28 PROCEDURE — 37000009 HC ANESTHESIA EA ADD 15 MINS: Performed by: INTERNAL MEDICINE

## 2025-01-28 PROCEDURE — 85025 COMPLETE CBC W/AUTO DIFF WBC: CPT | Performed by: EMERGENCY MEDICINE

## 2025-01-28 PROCEDURE — 99900031 HC PATIENT EDUCATION (STAT)

## 2025-01-28 PROCEDURE — 83690 ASSAY OF LIPASE: CPT | Performed by: EMERGENCY MEDICINE

## 2025-01-28 RX ORDER — LANOLIN ALCOHOL/MO/W.PET/CERES
800 CREAM (GRAM) TOPICAL
Status: DISCONTINUED | OUTPATIENT
Start: 2025-01-28 | End: 2025-01-31 | Stop reason: HOSPADM

## 2025-01-28 RX ORDER — ACETAMINOPHEN 325 MG/1
650 TABLET ORAL EVERY 8 HOURS PRN
Status: DISCONTINUED | OUTPATIENT
Start: 2025-01-28 | End: 2025-01-31 | Stop reason: HOSPADM

## 2025-01-28 RX ORDER — PANTOPRAZOLE SODIUM 40 MG/10ML
40 INJECTION, POWDER, LYOPHILIZED, FOR SOLUTION INTRAVENOUS 2 TIMES DAILY
Status: DISCONTINUED | OUTPATIENT
Start: 2025-01-28 | End: 2025-01-31 | Stop reason: HOSPADM

## 2025-01-28 RX ORDER — DICYCLOMINE HYDROCHLORIDE 10 MG/ML
20 INJECTION INTRAMUSCULAR
Status: COMPLETED | OUTPATIENT
Start: 2025-01-28 | End: 2025-01-28

## 2025-01-28 RX ORDER — ACETAMINOPHEN 325 MG/1
650 TABLET ORAL EVERY 4 HOURS PRN
Status: DISCONTINUED | OUTPATIENT
Start: 2025-01-28 | End: 2025-01-31 | Stop reason: HOSPADM

## 2025-01-28 RX ORDER — SODIUM CHLORIDE 9 MG/ML
INJECTION, SOLUTION INTRAVENOUS CONTINUOUS
Status: DISCONTINUED | OUTPATIENT
Start: 2025-01-28 | End: 2025-01-31

## 2025-01-28 RX ORDER — IBUPROFEN 200 MG
16 TABLET ORAL
Status: DISCONTINUED | OUTPATIENT
Start: 2025-01-28 | End: 2025-01-31 | Stop reason: HOSPADM

## 2025-01-28 RX ORDER — SODIUM,POTASSIUM PHOSPHATES 280-250MG
2 POWDER IN PACKET (EA) ORAL
Status: DISCONTINUED | OUTPATIENT
Start: 2025-01-28 | End: 2025-01-31 | Stop reason: HOSPADM

## 2025-01-28 RX ORDER — IBUPROFEN 200 MG
24 TABLET ORAL
Status: DISCONTINUED | OUTPATIENT
Start: 2025-01-28 | End: 2025-01-31 | Stop reason: HOSPADM

## 2025-01-28 RX ORDER — ALUMINUM HYDROXIDE, MAGNESIUM HYDROXIDE, AND SIMETHICONE 1200; 120; 1200 MG/30ML; MG/30ML; MG/30ML
30 SUSPENSION ORAL 4 TIMES DAILY PRN
Status: DISCONTINUED | OUTPATIENT
Start: 2025-01-28 | End: 2025-01-31 | Stop reason: HOSPADM

## 2025-01-28 RX ORDER — ONDANSETRON HYDROCHLORIDE 2 MG/ML
4 INJECTION, SOLUTION INTRAVENOUS EVERY 6 HOURS PRN
Status: DISCONTINUED | OUTPATIENT
Start: 2025-01-28 | End: 2025-01-31 | Stop reason: HOSPADM

## 2025-01-28 RX ORDER — HYDROCODONE BITARTRATE AND ACETAMINOPHEN 5; 325 MG/1; MG/1
1 TABLET ORAL EVERY 6 HOURS PRN
Status: DISCONTINUED | OUTPATIENT
Start: 2025-01-28 | End: 2025-01-31 | Stop reason: HOSPADM

## 2025-01-28 RX ORDER — TALC
6 POWDER (GRAM) TOPICAL NIGHTLY PRN
Status: DISCONTINUED | OUTPATIENT
Start: 2025-01-28 | End: 2025-01-31 | Stop reason: HOSPADM

## 2025-01-28 RX ORDER — NALOXONE HCL 0.4 MG/ML
0.02 VIAL (ML) INJECTION
Status: DISCONTINUED | OUTPATIENT
Start: 2025-01-28 | End: 2025-01-31 | Stop reason: HOSPADM

## 2025-01-28 RX ORDER — HYDROMORPHONE HYDROCHLORIDE 1 MG/ML
0.5 INJECTION, SOLUTION INTRAMUSCULAR; INTRAVENOUS; SUBCUTANEOUS
Status: COMPLETED | OUTPATIENT
Start: 2025-01-28 | End: 2025-01-28

## 2025-01-28 RX ORDER — GLUCAGON 1 MG
1 KIT INJECTION
Status: DISCONTINUED | OUTPATIENT
Start: 2025-01-28 | End: 2025-01-31 | Stop reason: HOSPADM

## 2025-01-28 RX ORDER — MORPHINE SULFATE 2 MG/ML
2 INJECTION, SOLUTION INTRAMUSCULAR; INTRAVENOUS
Status: DISCONTINUED | OUTPATIENT
Start: 2025-01-28 | End: 2025-01-31 | Stop reason: HOSPADM

## 2025-01-28 RX ORDER — ACETAMINOPHEN 325 MG/1
325 TABLET ORAL EVERY 6 HOURS PRN
Status: ON HOLD | COMMUNITY
End: 2025-01-31 | Stop reason: HOSPADM

## 2025-01-28 RX ORDER — PROPOFOL 10 MG/ML
VIAL (ML) INTRAVENOUS
Status: DISCONTINUED | OUTPATIENT
Start: 2025-01-28 | End: 2025-01-28

## 2025-01-28 RX ADMIN — PIPERACILLIN SODIUM AND TAZOBACTAM SODIUM 3.38 G: 3; .375 INJECTION, POWDER, FOR SOLUTION INTRAVENOUS at 08:01

## 2025-01-28 RX ADMIN — PROPOFOL 30 MG: 10 INJECTION, EMULSION INTRAVENOUS at 01:01

## 2025-01-28 RX ADMIN — PANTOPRAZOLE SODIUM 40 MG: 40 INJECTION, POWDER, FOR SOLUTION INTRAVENOUS at 11:01

## 2025-01-28 RX ADMIN — DICYCLOMINE HYDROCHLORIDE 20 MG: 10 INJECTION, SOLUTION INTRAMUSCULAR at 02:01

## 2025-01-28 RX ADMIN — HYDROMORPHONE HYDROCHLORIDE 0.5 MG: 1 INJECTION, SOLUTION INTRAMUSCULAR; INTRAVENOUS; SUBCUTANEOUS at 04:01

## 2025-01-28 RX ADMIN — HYDROCODONE BITARTRATE AND ACETAMINOPHEN 1 TABLET: 5; 325 TABLET ORAL at 09:01

## 2025-01-28 RX ADMIN — PANTOPRAZOLE SODIUM 40 MG: 40 INJECTION, POWDER, FOR SOLUTION INTRAVENOUS at 08:01

## 2025-01-28 RX ADMIN — PIPERACILLIN SODIUM AND TAZOBACTAM SODIUM 3.38 G: 3; .375 INJECTION, POWDER, FOR SOLUTION INTRAVENOUS at 01:01

## 2025-01-28 RX ADMIN — ONDANSETRON 4 MG: 2 INJECTION INTRAMUSCULAR; INTRAVENOUS at 02:01

## 2025-01-28 RX ADMIN — SODIUM CHLORIDE: 0.9 INJECTION, SOLUTION INTRAVENOUS at 01:01

## 2025-01-28 RX ADMIN — PROPOFOL 100 MG: 10 INJECTION, EMULSION INTRAVENOUS at 01:01

## 2025-01-28 RX ADMIN — SODIUM CHLORIDE 1000 ML: 9 INJECTION, SOLUTION INTRAVENOUS at 02:01

## 2025-01-28 RX ADMIN — SODIUM CHLORIDE: 9 INJECTION, SOLUTION INTRAVENOUS at 06:01

## 2025-01-28 RX ADMIN — HYDROCODONE BITARTRATE AND ACETAMINOPHEN 1 TABLET: 5; 325 TABLET ORAL at 07:01

## 2025-01-28 RX ADMIN — SODIUM CHLORIDE: 9 INJECTION, SOLUTION INTRAVENOUS at 08:01

## 2025-01-28 RX ADMIN — PIPERACILLIN SODIUM AND TAZOBACTAM SODIUM 4.5 G: 4; .5 INJECTION, POWDER, LYOPHILIZED, FOR SOLUTION INTRAVENOUS at 04:01

## 2025-01-28 NOTE — TRANSFER OF CARE
"Anesthesia Transfer of Care Note    Patient: Muriel Vasquez    Procedure(s) Performed: Procedure(s) (LRB):  ULTRASOUND, UPPER GI TRACT, ENDOSCOPIC (N/A)    Patient location: GI    Anesthesia Type: general    Transport from OR: Transported from OR on room air with adequate spontaneous ventilation    Post pain: adequate analgesia    Post assessment: no apparent anesthetic complications    Post vital signs: stable    Level of consciousness: sedated    Nausea/Vomiting: no nausea/vomiting    Complications: none    Transfer of care protocol was followed      Last vitals: Visit Vitals  /69   Pulse (!) 55   Temp 36.7 °C (98 °F) (Oral)   Resp 18   Ht 5' 7" (1.702 m)   Wt 99.8 kg (220 lb)   LMP 01/26/2025 (Exact Date)   SpO2 99%   Breastfeeding No   BMI 34.46 kg/m²     "

## 2025-01-28 NOTE — H&P
"  [facility] - Emergency Dept  Hospital Medicine  History & Physical    Patient Name: Muriel Vasquez  MRN: [id number]  Patient Class: OP- Observation  Admission Date: 1/27/2025  Attending Physician: Christy Patel MD  Primary Care Provider: Carmen, Primary Doctor    Patient seen at 5:42 a.m. on 01/28/2025.  History is obtained from the patient/ER physician/records  Subjective:     Principal Problem:Cholelithiasis with acute cholecystitis    Chief Complaint:   Chief Complaint   Patient presents with    Abdominal Pain     RUQ x2 hrs w/ nausea       HPI:   -Otherwise healthy 34-year-old  female who has had 3 visits to the ER over the last several months for gallbladder issues  -Patient has right upper quadrant pain precipitated by eating fatty foods; starts out mild and gets progressively worse-> also has nausea with it  -In the ER she was afebrile and hemodynamically stable  -WBC was 18.19 with a normal T bill but AST was 41; she is not pregnant  -Does have evidence of a UTI with 13 WBC, moderate bacteria, and trace leukocytes  -Abdominal ultrasound showed per radiologist Dr. Goyo Gallego:    "Impression:     Cholelithiasis with equivocal evaluation for acute cholecystitis.  The technologist reports a positive sonographic Farooq sign, although there are no additional sonographic features to suggest acute cholecystitis at this time.  If there is strong clinical concern for acute cholecystitis, HIDA scan could be performed for more sensitive evaluation.  Consider surgical consultation/follow-up if not previously performed in light of recurrent symptomatology.     Dilated common bile duct measuring up to 11-12 mm, slightly increased in extent from prior examination.  No definite filling defect appreciated within the visualized common bile duct to suggest choledocholithiasis.  This remains of uncertain etiology.  If there is clinical concern for biliary obstruction, ERCP or MRCP may be performed as " "warranted.     Mild hepatomegaly.     This report was flagged in Epic as abnormal."    -She received a dose of Dilaudid, a L of fluid, and was started on a regimen of Zosyn; pain is 0/10->MRCP is pending      No past medical history on file.    No past surgical history on file.    Review of patient's allergies indicates:  No Known Allergies    No current facility-administered medications on file prior to encounter.     Current Outpatient Medications on File Prior to Encounter   Medication Sig    azithromycin (Z-NICOLA) 250 MG tablet Take 250 mg by mouth once daily.    ergocalciferol (ERGOCALCIFEROL) 50,000 unit Cap Take 50,000 Units by mouth every 7 days.    HYDROcodone-acetaminophen (NORCO) 5-325 mg per tablet Take 1 tablet by mouth every 6 (six) hours as needed for Pain.    ondansetron (ZOFRAN-ODT) 4 MG TbDL Take 1 tablet (4 mg total) by mouth every 6 (six) hours as needed.     Family History    None       Tobacco Use    Smoking status: Every Day (<1 ppd)     Types: Cigarettes, Vaping with nicotine    Smokeless tobacco: Never    Tobacco comments:     About 5 cigs a day   Substance and Sexual Activity    Alcohol use: Social    Drug use: No                Review of Systems   Constitutional:  Negative for chills, fever and unexpected weight change.   HENT:  Positive for sore throat. Negative for rhinorrhea.    Respiratory:  Negative for shortness of breath.    Cardiovascular:  Negative for chest pain and leg swelling.   Gastrointestinal:  Positive for abdominal pain and nausea. Negative for constipation, diarrhea and vomiting.   Genitourinary:  Negative for dysuria.   Musculoskeletal:  Negative for myalgias.   Skin: Negative.    Neurological:  Negative for numbness.   Hematological:  Negative for adenopathy.     Objective:     Vital Signs (Most Recent):  Temp: 97.5 °F (36.4 °C) (01/27/25 2257)  Pulse: (!) 48 (01/28/25 0619)  Resp: 16 (01/28/25 0619)  BP: 116/66 (01/28/25 0619)  SpO2: 100 % (01/28/25 0619) Vital Signs " (24h Range):  Temp:  [97.5 °F (36.4 °C)] 97.5 °F (36.4 °C)  Pulse:  [48-86] 48  Resp:  [16-18] 16  SpO2:  [93 %-100 %] 100 %  BP: ()/(52-66) 116/66     Weight: 99.8 kg (220 lb)  Body mass index is 34.46 kg/m².     Physical Exam  Constitutional:       General: She is not in acute distress.     Appearance: Normal appearance. She is not ill-appearing, toxic-appearing or diaphoretic.   HENT:      Head: Normocephalic and atraumatic.   Eyes:      General: No scleral icterus.  Neck:      Comments: No JVD/retractions  Cardiovascular:      Rate and Rhythm: Regular rhythm.      Heart sounds: Normal heart sounds. No murmur heard.     No friction rub. No gallop.   Pulmonary:      Effort: Pulmonary effort is normal. No respiratory distress.      Breath sounds: Normal breath sounds.   Abdominal:      General: Bowel sounds are normal. There is no distension.      Palpations: Abdomen is soft. There is no mass.      Tenderness: There is no abdominal tenderness.   Musculoskeletal:      Right lower leg: No edema.      Left lower leg: No edema.   Skin:     General: Skin is warm and dry.   Neurological:      Mental Status: She is alert.   Psychiatric:         Behavior: Behavior normal.         Thought Content: Thought content normal.         Judgment: Judgment normal.     Significant studies: Reviewed.  Assessment/Plan:     * Cholelithiasis with acute cholecystitis    -No definitive radiographic evidence of cholecystitis, but clinically presents as such  -Concern for dilated common bile duct; concern for possible concomitant UTI; for now:  -NPO  -Maintenance fluids  -Continue Zosyn  -Zofran  -Prn IV Morphine  -MRCP pending-> depending on findings, may need GI consult  -Surgery consult, for consideration for cholecystectomy  -Urine culture pending      On 01/28/2025, patient should be placed in hospital observation services under my care.       Christy Patel MD  Department of Hospital Medicine  [facility] - Emergency  Dept

## 2025-01-28 NOTE — [employer] PATIENT INSTRUCTIONS
Discharge Summary/Instructions after an Endoscopic Procedure  Patient Name: Muriel Vasquez  Patient MRN: [id number]  Patient YOB: 1990 Tuesday, January 28, 2025  Anil Garcia III, MD  RESTRICTIONS:  During your procedure today, you received medications for sedation.  These   medications may affect your judgment, balance and coordination.  Therefore,   for 24 hours, you have the following restrictions:   - DO NOT drive a car, operate machinery, make legal/financial decisions,   sign important papers or drink alcohol.    ACTIVITY:  Today: no heavy lifting, straining or running due to procedural   sedation/anesthesia.  The following day: return to full activity including work.  DIET:  Eat and drink normally unless instructed otherwise.     TREATMENT FOR COMMON SIDE EFFECTS:  - Mild abdominal pain, nausea, belching, bloating or excessive gas:  rest,   eat lightly and use a heating pad.  - Sore Throat: treat with throat lozenges and/or gargle with warm salt   water.  - Because air was used during the procedure, expelling large amounts of air   from your rectum or belching is normal.  - If a bowel prep was taken, you may not have a bowel movement for 1-3 days.    This is normal.  SYMPTOMS TO WATCH FOR AND REPORT TO YOUR PHYSICIAN:  1. Abdominal pain or bloating, other than gas cramps.  2. Chest pain.  3. Back pain.  4. Signs of infection such as: chills or fever occurring within 24 hours   after the procedure.  5. Rectal bleeding, which would show as bright red, maroon, or black stools.   (A tablespoon of blood from the rectum is not serious, especially if   hemorrhoids are present.)  6. Vomiting.  7. Weakness or dizziness.  GO DIRECTLY TO THE NEAREST EMERGENCY ROOM IF YOU HAVE ANY OF THE FOLLOWING:      Difficulty breathing              Chills and/or fever over 101 F   Persistent vomiting and/or vomiting blood   Severe abdominal pain   Severe chest pain   Black, tarry stools   Bleeding- more than one  tablespoon   Any other symptom or condition that you feel may need urgent attention  Your doctor recommends these additional instructions:  If any biopsies were taken, your doctors clinic will contact you in 1 to 2   weeks with any results.  - Patient has a contact number available for emergencies.  The signs and   symptoms of potential delayed complications were discussed with the   patient.  Return to normal activities tomorrow.  Written discharge   instructions were provided to the patient.   - Resume regular diet.   - Continue present medications.   - Proceed w/ lap humberto; ERCP if LFTs go up  - Return patient to hospital [facility] for ongoing care.  For questions, problems or results please call your physician - Anil Garcia III, MD at Work:  [phone].  [facility], EMERGENCY ROOM PHONE NUMBER: [phone]  IF A COMPLICATION OR EMERGENCY SITUATION ARISES AND YOU ARE UNABLE TO REACH   YOUR PHYSICIAN - GO DIRECTLY TO THE EMERGENCY ROOM.  Anil Garcia III, MD  1/28/2025 1:58:14 PM  This report has been verified and signed electronically.  Dear patient,  As a result of recent federal legislation (The Federal Cures Act), you may   receive lab or pathology results from your procedure in your [facility]   account before your physician is able to contact you. Your physician or   their representative will relay the results to you with their   recommendations at their soonest availability.  Thank you,  [employer]

## 2025-01-28 NOTE — ASSESSMENT & PLAN NOTE
No definitive radiographic evidence of cholecystitis, but clinically presents as such  Concern for dilated common bile duct; concern for possible concomitant UTI; for now:  NPO  Maintenance fluids  Continue Zosyn  Zofran  Prn IV Morphine  MRCP pending-> depending on findings, may need GI consult  Surgery consult, for consideration for cholecystectomy  Urine culture pending

## 2025-01-28 NOTE — SUBJECTIVE & OBJECTIVE
No past medical history on file.    No past surgical history on file.    Review of patient's allergies indicates:  No Known Allergies    No current facility-administered medications on file prior to encounter.     Current Outpatient Medications on File Prior to Encounter   Medication Sig    azithromycin (Z-NICOLA) 250 MG tablet Take 250 mg by mouth once daily.    ergocalciferol (ERGOCALCIFEROL) 50,000 unit Cap Take 50,000 Units by mouth every 7 days.    HYDROcodone-acetaminophen (NORCO) 5-325 mg per tablet Take 1 tablet by mouth every 6 (six) hours as needed for Pain.    ondansetron (ZOFRAN-ODT) 4 MG TbDL Take 1 tablet (4 mg total) by mouth every 6 (six) hours as needed.     Family History    None       Tobacco Use    Smoking status: Every Day     Types: Cigarettes, Vaping with nicotine    Smokeless tobacco: Never    Tobacco comments:     About 5 cigs a day   Substance and Sexual Activity    Alcohol use: Not Currently    Drug use: Yes     Types: Marijuana    Sexual activity: Yes     Review of Systems   Constitutional:  Negative for chills, fever and unexpected weight change.   HENT:  Positive for sore throat. Negative for rhinorrhea.    Respiratory:  Negative for shortness of breath.    Cardiovascular:  Negative for chest pain and leg swelling.   Gastrointestinal:  Positive for abdominal pain and nausea. Negative for constipation, diarrhea and vomiting.   Genitourinary:  Negative for dysuria.   Musculoskeletal:  Negative for myalgias.   Skin: Negative.    Neurological:  Negative for numbness.   Hematological:  Negative for adenopathy.     Objective:     Vital Signs (Most Recent):  Temp: 97.5 °F (36.4 °C) (01/27/25 2257)  Pulse: (!) 48 (01/28/25 0619)  Resp: 16 (01/28/25 0619)  BP: 116/66 (01/28/25 0619)  SpO2: 100 % (01/28/25 0619) Vital Signs (24h Range):  Temp:  [97.5 °F (36.4 °C)] 97.5 °F (36.4 °C)  Pulse:  [48-86] 48  Resp:  [16-18] 16  SpO2:  [93 %-100 %] 100 %  BP: ()/(52-66) 116/66     Weight: 99.8 kg (220  lb)  Body mass index is 34.46 kg/m².     Physical Exam  Constitutional:       General: She is not in acute distress.     Appearance: Normal appearance. She is not ill-appearing, toxic-appearing or diaphoretic.   HENT:      Head: Normocephalic and atraumatic.   Eyes:      General: No scleral icterus.  Neck:      Comments: No JVD/retractions  Cardiovascular:      Rate and Rhythm: Regular rhythm.      Heart sounds: Normal heart sounds. No murmur heard.     No friction rub. No gallop.   Pulmonary:      Effort: Pulmonary effort is normal. No respiratory distress.      Breath sounds: Normal breath sounds.   Abdominal:      General: Bowel sounds are normal. There is no distension.      Palpations: Abdomen is soft. There is no mass.      Tenderness: There is no abdominal tenderness.   Musculoskeletal:      Right lower leg: No edema.      Left lower leg: No edema.   Skin:     General: Skin is warm and dry.   Neurological:      Mental Status: She is alert.   Psychiatric:         Behavior: Behavior normal.         Thought Content: Thought content normal.         Judgment: Judgment normal.                Significant studies: Reviewed.

## 2025-01-28 NOTE — ANESTHESIA POSTPROCEDURE EVALUATION
Anesthesia Post Evaluation    Patient: Muriel Vasquez    Procedure(s) Performed: Procedure(s) (LRB):  ULTRASOUND, UPPER GI TRACT, ENDOSCOPIC (N/A)    Final Anesthesia Type: general      Patient location during evaluation: GI PACU  Patient participation: Yes- Able to Participate  Level of consciousness: awake and alert  Post-procedure vital signs: reviewed and stable  Pain management: adequate  Airway patency: patent    PONV status at discharge: No PONV  Anesthetic complications: no      Cardiovascular status: stable  Respiratory status: unassisted  Hydration status: euvolemic  Follow-up not needed.  Comments: VSS              Vitals Value Taken Time   BP  01/28/25 1431   Temp  01/28/25 1431   Pulse  01/28/25 1431   Resp  01/28/25 1431   SpO2  01/28/25 1431         Event Time   Out of Recovery 01/28/2025 14:22:06         Pain/Ashley Score: Pain Rating Prior to Med Admin: 5 (1/28/2025  9:28 AM)  Pain Rating Post Med Admin: 0 (1/28/2025 10:26 AM)

## 2025-01-28 NOTE — ED PROVIDER NOTES
Encounter Date: 1/27/2025       History     Chief Complaint   Patient presents with    Abdominal Pain     RUQ x2 hrs w/ nausea      HPI    34-year-old female with past medical history of cholelithiasis, presented to emergency department for right upper quadrant pain.  Right upper quadrant pain started last night around 9:00 p.m. after eating salami.  Has been constant with some nausea.  Denies vomiting, fever, chest pain, chest pain.  Is on her menstrual.  Feels similar to her previous pain when she was worked up for cholecystitis.    Review of patient's allergies indicates:  No Known Allergies  No past medical history on file.  No past surgical history on file.  No family history on file.  Social History     Tobacco Use    Smoking status: Every Day     Types: Cigarettes, Vaping with nicotine    Smokeless tobacco: Never    Tobacco comments:     About 5 cigs a day   Substance Use Topics    Alcohol use: Not Currently    Drug use: Yes     Types: Marijuana     Review of Systems    See HPI, otherwise negative    Physical Exam     Initial Vitals   BP Pulse Resp Temp SpO2   01/27/25 2300 01/27/25 2257 01/27/25 2257 01/27/25 2257 01/27/25 2257   (!) 91/59 (!) 51 17 97.5 °F (36.4 °C) 100 %      MAP       --                Physical Exam    Nursing note and vitals reviewed.  Constitutional: She appears well-developed and well-nourished.   HENT:   Head: Normocephalic and atraumatic.   Eyes: Conjunctivae and EOM are normal. Pupils are equal, round, and reactive to light.   Neck: Neck supple.   Normal range of motion.  Cardiovascular:  Normal rate and regular rhythm.           Pulmonary/Chest: Breath sounds normal.   Abdominal: There is abdominal tenderness (Right quadrant tenderness) in the right upper quadrant. There is positive Farooq's sign.   Musculoskeletal:         General: Normal range of motion.      Cervical back: Normal range of motion and neck supple.     Neurological: She is alert and oriented to person, place, and  time.   Skin: Skin is warm and dry. Capillary refill takes less than 2 seconds.   Psychiatric: She has a normal mood and affect.         ED Course   Procedures  Labs Reviewed   CBC W/ AUTO DIFFERENTIAL - Abnormal       Result Value    WBC 18.19 (*)     RBC 3.97 (*)     Hemoglobin 13.0      Hematocrit 39.6       (*)     MCH 32.7 (*)     MCHC 32.8      RDW 13.2      Platelets 328      MPV 11.0      Immature Granulocytes 0.3      Gran # (ANC) 15.0 (*)     Immature Grans (Abs) 0.06 (*)     Lymph # 2.0      Mono # 1.0      Eos # 0.1      Baso # 0.06      nRBC 0      Gran % 82.7 (*)     Lymph % 10.9 (*)     Mono % 5.4      Eosinophil % 0.4      Basophil % 0.3      Differential Method Automated     COMPREHENSIVE METABOLIC PANEL - Abnormal    Sodium 138      Potassium 4.2      Chloride 103      CO2 29      Glucose 110      BUN 8      Creatinine 0.8      Calcium 10.1      Total Protein 7.4      Albumin 4.5      Total Bilirubin 0.7      Alkaline Phosphatase 53 (*)     AST 41 (*)     ALT 22      eGFR >60.0      Anion Gap 6 (*)    URINALYSIS, REFLEX TO URINE CULTURE - Abnormal    Specimen UA Urine, Clean Catch      Color, UA Orange (*)     Appearance, UA Cloudy (*)     pH, UA 6.0      Specific Gravity, UA >1.030 (*)     Protein, UA 1+ (*)     Glucose, UA Trace (*)     Ketones, UA Trace (*)     Bilirubin (UA) 1+ (*)     Occult Blood UA TRACE      Nitrite, UA Negative      Urobilinogen, UA 4.0-6.0 (*)     Leukocytes, UA Trace (*)     Narrative:     In and Out Cath as needed it patient unable to void  Specimen Source->Urine   URINALYSIS MICROSCOPIC - Abnormal    RBC, UA 31 (*)     WBC, UA 13 (*)     Bacteria Moderate (*)     Squam Epithel, UA >100      Hyaline Casts, UA 0      Microscopic Comment SEE COMMENT      Narrative:     In and Out Cath as needed it patient unable to void  Specimen Source->Urine   CULTURE, URINE   LIPASE    Lipase 25     HCG, QUANTITATIVE   HCG, QUANTITATIVE    HCG Quant <0.60     POCT URINE  PREGNANCY    POC Preg Test, Ur Negative       Acceptable Yes            Imaging Results               US Abdomen Limited (Final result)  Result time 01/28/25 02:07:04      Final result by Goyo Gallego MD (01/28/25 02:07:04)                   Impression:      Cholelithiasis with equivocal evaluation for acute cholecystitis.  The technologist reports a positive sonographic Farooq sign, although there are no additional sonographic features to suggest acute cholecystitis at this time.  If there is strong clinical concern for acute cholecystitis, HIDA scan could be performed for more sensitive evaluation.  Consider surgical consultation/follow-up if not previously performed in light of recurrent symptomatology.    Dilated common bile duct measuring up to 11-12 mm, slightly increased in extent from prior examination.  No definite filling defect appreciated within the visualized common bile duct to suggest choledocholithiasis.  This remains of uncertain etiology.  If there is clinical concern for biliary obstruction, ERCP or MRCP may be performed as warranted.    Mild hepatomegaly.    This report was flagged in Epic as abnormal.      Electronically signed by: Goyo Gallego MD  Date:    01/28/2025  Time:    02:07               Narrative:    EXAMINATION:  US ABDOMEN LIMITED    CLINICAL HISTORY:  Right upper quadrant pain, rule out cholecystitis;    TECHNIQUE:  Limited ultrasound of the right upper quadrant of the abdomen (including pancreas, liver, gallbladder, common bile duct, and spleen) was performed.    COMPARISON:  Ultrasound 10/21/2024, MRCP 10/21/2024    FINDINGS:  Liver: The liver is mildly enlarged and measures 18.6 cm.  No focal hepatic abnormality identified.    Gallbladder: There is cholelithiasis present.  No significant gallbladder wall thickening or hyperemia appreciated.  There is no significant pericholecystic fluid.  The technologist reports a positive sonographic Farooq  sign.    Biliary system: The common duct is dilated, measuring 11-12 mm, slightly increased from prior ultrasound examination (previously 9-10 mm).  No discrete echogenic filling defect appreciated in the visualized portions of the common bile duct.  No significant intrahepatic biliary ductal dilatation appreciated.    Pancreas: Visualized portion appears within normal limits.    Right kidney: No evidence of hydronephrosis.    Miscellaneous: No upper abdominal ascites.                                       Medications   piperacillin-tazobactam (ZOSYN) 4.5 g in D5W 100 mL IVPB (MB+) (4.5 g Intravenous New Bag 1/28/25 0457)   ondansetron injection 4 mg (4 mg Intravenous Given 1/28/25 0222)   sodium chloride 0.9% bolus 1,000 mL 1,000 mL (0 mLs Intravenous Stopped 1/28/25 0324)   HYDROmorphone injection 0.5 mg (0.5 mg Intravenous Given 1/28/25 0453)   dicyclomine injection 20 mg (20 mg Intramuscular Given 1/28/25 0235)     Medical Decision Making  Problems Addressed:  Abdominal pain: acute illness or injury  Right upper quadrant pain: acute illness or injury    Amount and/or Complexity of Data Reviewed  Labs: ordered.     Details: See narrative below  Radiology: ordered and independent interpretation performed.     Details: See narrative below    Risk  OTC drugs.  Prescription drug management.  Parenteral controlled substances.  Decision regarding hospitalization.    In brief, a 34-year-old female with past medical history of cholelithiasis, presented to emergency department for right upper quadrant pain since last night.    Vital signs stable, patient appears to be in discomfort due to abdominal pain.  Otherwise patient nontoxic appearing.  Physical exam per above.    Differentials include cholecystitis, choledocholithiasis, pancreatitis, ectopic right neck, among others.    Labs and imaging individually interpreted by me:     CBC with leukocytosis of 18.19, lipase within normal limits.  CMP with no electrolyte  derangements with a normal creatinine.  T bili normal.  Mildly elevated AST.  Patient does endorse taking Tylenol for pain management.  Pregnancy test negative.  UA noted for trace leukocyte esterase and moderate bacteria with squames, patient denying dysuria at this time.    Interventions include IV fluids due to patient's decreased p.o. intake, Zofran for nausea, Bentyl and Dilaudid for pain management.    Ultrasound noted for equivocal cholecystitis due to positive sonographic Farooq sign.  She does however have cholelithiasis.  Also CBD more dilated than previous study.      Due to significant pain on clinical exam, leukocytosis, medicine consulted for admission for further evaluation and imaging for cholecystitis versus choledocholithiasis.  Anticipate admission.      Myrna King MD                               Clinical Impression:  Final diagnoses:  [R10.11] Right upper quadrant pain  [R10.9] Abdominal pain                 Myrna King MD  Resident  01/28/25 0528

## 2025-01-28 NOTE — CONSULTS
GASTROENTEROLOGY INPATIENT CONSULT NOTE  Patient Name: Muriel Vasquez  Patient MRN: [id number]  Patient : 1990    Admit Date: 2025  Service date: 2025    Reason for Consult: dilated biliary    PCP: No, Primary Doctor    Chief Complaint   Patient presents with    Abdominal Pain     RUQ x2 hrs w/ nausea        HPI: Patient is a 34 y.o. female with PMHx gallstones presents for evaluation of epig/RUQ pain. Acute / subacute, intermittent x months, now resolved since admission. No f/c. Known gallstones. Pain has since subsided.     CHART REVIEW:   MRCP  - gallstones; stable biliary dilation w/o filling defect    Past Medical History:  History reviewed. No pertinent past medical history.     Past Surgical History:  History reviewed. No pertinent surgical history.     Home Medications:  Medications Prior to Admission   Medication Sig Dispense Refill Last Dose/Taking    acetaminophen (TYLENOL) 325 MG tablet Take 325 mg by mouth every 6 (six) hours as needed for Pain.   2025 Morning       Inpatient Medications:   pantoprazole  40 mg Intravenous BID    piperacillin-tazobactam (Zosyn) IV (PEDS and ADULTS) (extended infusion is not appropriate)  3.375 g Intravenous Q8H       Current Facility-Administered Medications:     acetaminophen, 650 mg, Oral, Q8H PRN    acetaminophen, 650 mg, Oral, Q4H PRN    aluminum-magnesium hydroxide-simethicone, 30 mL, Oral, QID PRN    dextrose 50%, 12.5 g, Intravenous, PRN    dextrose 50%, 25 g, Intravenous, PRN    glucagon (human recombinant), 1 mg, Intramuscular, PRN    glucose, 16 g, Oral, PRN    glucose, 24 g, Oral, PRN    HYDROcodone-acetaminophen, 1 tablet, Oral, Q6H PRN    magnesium oxide, 800 mg, Oral, PRN    magnesium oxide, 800 mg, Oral, PRN    melatonin, 6 mg, Oral, Nightly PRN    morphine, 2 mg, Intravenous, Q2H PRN    naloxone, 0.02 mg, Intravenous, PRN    ondansetron, 4 mg, Intravenous, Q6H PRN    potassium bicarbonate, 35 mEq, Oral, PRN    potassium  "bicarbonate, 50 mEq, Oral, PRN    potassium bicarbonate, 60 mEq, Oral, PRN    potassium, sodium phosphates, 2 packet, Oral, PRN    potassium, sodium phosphates, 2 packet, Oral, PRN    potassium, sodium phosphates, 2 packet, Oral, PRN    Review of patient's allergies indicates:  No Known Allergies    Social History:   Social History     Occupational History    Not on file   Tobacco Use    Smoking status: Every Day     Types: Cigarettes, Vaping with nicotine    Smokeless tobacco: Never    Tobacco comments:     About 5 cigs a day   Substance and Sexual Activity    Alcohol use: Not Currently    Drug use: Yes     Types: Marijuana    Sexual activity: Yes       Family History:   No family history on file.    Review of Systems:  A 10 point review of systems was performed and was normal, except as mentioned in the HPI, including constitutional, HEENT, heme, lymph, cardiovascular, respiratory, gastrointestinal, genitourinary, neurologic, endocrine, psychiatric and musculoskeletal.      OBJECTIVE:    Physical Exam:  24 Hour Vital Sign Ranges: Temp:  [97.5 °F (36.4 °C)] 97.5 °F (36.4 °C)  Pulse:  [44-86] 55  Resp:  [16-18] 18  SpO2:  [93 %-100 %] 99 %  BP: ()/(52-69) 117/69  Most recent vitals: /69   Pulse (!) 55   Temp 97.5 °F (36.4 °C) (Oral)   Resp 18   Ht 5' 7" (1.702 m)   Wt 99.8 kg (220 lb)   LMP 01/26/2025 (Exact Date)   SpO2 99%   Breastfeeding No   BMI 34.46 kg/m²    GEN: well-developed, well-nourished, awake and alert, non-toxic appearing adult  HEENT: PERRL, sclera anicteric, oral mucosa pink and moist without lesion  NECK: trachea midline; Good ROM  CV: regular rate and rhythm, no murmurs or gallops  RESP: clear to auscultation bilaterally, no wheezes, rhonci or rales  ABD: soft, non-tender, non-distended, normal bowel sounds  EXT: no swelling or edema, 2+ pulses distally  SKIN: no rashes or jaundice  PSYCH: normal affect    Labs:   Recent Labs     01/28/25  0039   WBC 18.19*   *   PLT " "328     Recent Labs     01/28/25  0039      K 4.2      CO2 29   BUN 8        No results for input(s): "ALB" in the last 72 hours.    Invalid input(s): "ALKP", "SGOT", "SGPT", "TBIL", "DBIL", "TPRO"  No results for input(s): "PT", "INR", "PTT" in the last 72 hours.      Radiology Review:  MRI MRCP Abdomen WO Contrast 3D WO Independent WS XPD   Final Result      1. Gallstones.   2. Mild dilation of the common bile duct at up to 9 mm, unchanged compared to October 2024.  No evidence of choledocholithiasis.         Electronically signed by: Mani Kim   Date:    01/28/2025   Time:    12:14      US Abdomen Limited   Final Result   Abnormal      Cholelithiasis with equivocal evaluation for acute cholecystitis.  The technologist reports a positive sonographic Farooq sign, although there are no additional sonographic features to suggest acute cholecystitis at this time.  If there is strong clinical concern for acute cholecystitis, HIDA scan could be performed for more sensitive evaluation.  Consider surgical consultation/follow-up if not previously performed in light of recurrent symptomatology.      Dilated common bile duct measuring up to 11-12 mm, slightly increased in extent from prior examination.  No definite filling defect appreciated within the visualized common bile duct to suggest choledocholithiasis.  This remains of uncertain etiology.  If there is clinical concern for biliary obstruction, ERCP or MRCP may be performed as warranted.      Mild hepatomegaly.      This report was flagged in Epic as abnormal.         Electronically signed by: Goyo Gallego MD   Date:    01/28/2025   Time:    02:07            IMPRESSION / RECOMMENDATIONS:   34 y.o. female with PMHx gallstones presents for evaluation of epig/RUQ pain w/ dilated duct but negative MRCP x2. Given degreee of dilation which is atypical in young female, will evaluate w/ EGD/EUS +/-ERCP. RIsks, benefits, alternatives discussed in " detail regarding upcoming procedures and sedation and possible complications. Some of the more common endoscopic complications include but not limited to immediate or delayed perforation, bleeding, infections, pain, inadvertent injury to surrounding tissue / organs and possible need for surgical evaluation. All questions answered and will proceed with procedure as planned.     -EGD/EUS now; ERCP if + or if LFTs become elevated  -Lap humberto regardless.     Thank you for this consult.    Anil Garcia III  1/28/2025  1:29 PM

## 2025-01-28 NOTE — ANESTHESIA PREPROCEDURE EVALUATION
01/28/2025  Muriel Vasquez is a 34 y.o., female.      Pre-op Assessment    I have reviewed the Patient Summary Reports.     I have reviewed the Nursing Notes. I have reviewed the NPO Status.   I have reviewed the Medications.     Review of Systems  Anesthesia Hx:             Denies Family Hx of Anesthesia complications.    Denies Personal Hx of Anesthesia complications.                    Social:  Smoker       Hematology/Oncology:  Hematology Normal   Oncology Normal                                   EENT/Dental:   No upper teeth          Cardiovascular:  Cardiovascular Normal                                              Pulmonary:  Pulmonary Normal                       Renal/:  Renal/ Normal                 Hepatic/GI:        Acute cholecystitis with right upper quadrant pain and nausea after eating fatty foods.  Last food 8:00 p.m. last night.  NPO today.  Mild nausea this morning but resolved.  No recent emesis.             Musculoskeletal:  Musculoskeletal Normal                Neurological:  Neurology Normal                                      Endocrine:  Endocrine Normal            Psych:  Psychiatric Normal                    Physical Exam  General: Well nourished, Cooperative, Alert and Oriented    Airway:  Mallampati: II   Mouth Opening: Normal  TM Distance: > 6 cm  Tongue: Normal  Neck ROM: Normal ROM    Dental:  Periodontal disease    Chest/Lungs:  Clear to auscultation, Normal Respiratory Rate    Heart:  Rate: Normal  Rhythm: Regular Rhythm  Sounds: Normal        Anesthesia Plan  Type of Anesthesia, risks & benefits discussed:    Anesthesia Type: Gen Natural Airway  Intra-op Monitoring Plan: Standard ASA Monitors  Induction:  IV  Informed Consent: Informed consent signed with the Patient and all parties understand the risks and agree with anesthesia plan.  All questions answered.   ASA  Score: 2 Emergent    Ready For Surgery From Anesthesia Perspective.     .

## 2025-01-28 NOTE — HPI
"Otherwise healthy 34-year-old  female who has had 3 visits to the ER over the last several months for gallbladder issues  Patient has right upper quadrant pain precipitated by eating fatty foods.  It starts out mild and gets progressively worse; she also has nausea with it  In the ER she was afebrile and hemodynamically stable  WBC was 18.19 with a normal T bill but AST was 41; she is not pregnant  Does have evidence of a UTI with 13 WBC, moderate bacteria, and trace leukocytes  Abdominal ultrasound showed per radiologist Dr. Goyo Gallego:    "Impression:     Cholelithiasis with equivocal evaluation for acute cholecystitis.  The technologist reports a positive sonographic Farooq sign, although there are no additional sonographic features to suggest acute cholecystitis at this time.  If there is strong clinical concern for acute cholecystitis, HIDA scan could be performed for more sensitive evaluation.  Consider surgical consultation/follow-up if not previously performed in light of recurrent symptomatology.     Dilated common bile duct measuring up to 11-12 mm, slightly increased in extent from prior examination.  No definite filling defect appreciated within the visualized common bile duct to suggest choledocholithiasis.  This remains of uncertain etiology.  If there is clinical concern for biliary obstruction, ERCP or MRCP may be performed as warranted.     Mild hepatomegaly.     This report was flagged in Epic as abnormal."    She received a dose of Dilaudid, a L of fluid, and was started on a regimen of Zosyn; pain is 0/10->MRCP is pending  "

## 2025-01-28 NOTE — CONSULTS
GENERAL SURGERY  INPATIENT CONSULT    REASON FOR CONSULT: Acute cholecystitis    HPI: Muriel Vasquez is a 34 y.o. female with known past medical history of gallstones and common bile duct dilatation who presented the emergency room with epigastric and right upper quadrant abdominal pain which acutely worsened. Had had intermittent pain for few months prior to this flare. Tends to be exacerbated by fatty foods. Has known history of gallstones and common bile duct dilatation.  It had previously undergone MRCP which confirmed bile duct dilatation but no obvious obstruction. Underwent ultrasound which showed concerns for acute cholecystitis due to positive Farooq sign though no pericholecystic fluid or gallbladder wall edema was appreciated. Common duct dilatation to 11-12 mm was seen which was increased from previous therefore repeat MRCP was obtained which failed to show any obvious stones.  Patient did undergo EUS with GI which was negative. General surgery has been consulted for further evaluation. The patient is seen.  Reports pain is now improved.    I have reviewed the patient's chart including prior progress notes, procedures and testing.     ROS:   Review of Systems    PROBLEM LIST:  Patient Active Problem List   Diagnosis    Cholelithiasis with acute cholecystitis         HISTORY  History reviewed. No pertinent past medical history.    History reviewed. No pertinent surgical history.    Social History     Tobacco Use    Smoking status: Every Day     Types: Cigarettes, Vaping with nicotine    Smokeless tobacco: Never    Tobacco comments:     About 5 cigs a day   Substance Use Topics    Alcohol use: Not Currently    Drug use: Yes     Types: Marijuana       No family history on file.      MEDS:  No current facility-administered medications on file prior to encounter.     Current Outpatient Medications on File Prior to Encounter   Medication Sig Dispense Refill    acetaminophen (TYLENOL) 325 MG tablet Take 325 mg  by mouth every 6 (six) hours as needed for Pain.      [DISCONTINUED] azithromycin (Z-NICOLA) 250 MG tablet Take 250 mg by mouth once daily.      [DISCONTINUED] ergocalciferol (ERGOCALCIFEROL) 50,000 unit Cap Take 50,000 Units by mouth every 7 days.      [DISCONTINUED] HYDROcodone-acetaminophen (NORCO) 5-325 mg per tablet Take 1 tablet by mouth every 6 (six) hours as needed for Pain. 12 tablet 0    [DISCONTINUED] ondansetron (ZOFRAN-ODT) 4 MG TbDL Take 1 tablet (4 mg total) by mouth every 6 (six) hours as needed. 12 tablet 0       ALLERGIES:  Review of patient's allergies indicates:  No Known Allergies      VITALS:  Temp:  [97.5 °F (36.4 °C)-98 °F (36.7 °C)] 98 °F (36.7 °C)  Pulse:  [44-86] 45  Resp:  [13-18] 14  SpO2:  [93 %-100 %] 100 %  BP: ()/(52-69) 106/59    I/O last 3 completed shifts:  In: 100 [IV Piggyback:100]  Out: -       PHYSICAL EXAM:  Physical Exam  Vitals reviewed.   Constitutional:       General: She is not in acute distress.     Appearance: Normal appearance. She is well-developed.   HENT:      Head: Normocephalic and atraumatic.      Nose: Nose normal.   Eyes:      General: No scleral icterus.     Conjunctiva/sclera: Conjunctivae normal.   Neck:      Trachea: No tracheal tenderness or tracheal deviation.   Cardiovascular:      Rate and Rhythm: Normal rate and regular rhythm.      Pulses: Normal pulses.   Pulmonary:      Effort: Pulmonary effort is normal. No accessory muscle usage or respiratory distress.      Breath sounds: Normal breath sounds.   Abdominal:      General: There is no distension.      Palpations: Abdomen is soft.      Tenderness: There is no abdominal tenderness.   Musculoskeletal:         General: No swelling or tenderness. Normal range of motion.      Cervical back: Normal range of motion and neck supple. No rigidity.   Skin:     General: Skin is warm and dry.      Coloration: Skin is not jaundiced.      Findings: No erythema.   Neurological:      General: No focal deficit  "present.      Mental Status: She is alert and oriented to person, place, and time.      Motor: No weakness or abnormal muscle tone.   Psychiatric:         Mood and Affect: Mood normal.         Behavior: Behavior normal.         Thought Content: Thought content normal.         Judgment: Judgment normal.           LABS:  Lab Results   Component Value Date    WBC 18.19 (H) 01/28/2025    RBC 3.97 (L) 01/28/2025    HGB 13.0 01/28/2025    HCT 39.6 01/28/2025     01/28/2025     Lab Results   Component Value Date     01/28/2025     01/28/2025    K 4.2 01/28/2025     01/28/2025    CO2 29 01/28/2025    BUN 8 01/28/2025    CREATININE 0.8 01/28/2025    CALCIUM 10.1 01/28/2025     Lab Results   Component Value Date    ALT 22 01/28/2025    AST 41 (H) 01/28/2025    ALKPHOS 53 (L) 01/28/2025    BILITOT 0.7 01/28/2025     No results found for: "MG", "PHOS"    STUDIES:  Images and reports were personally reviewed.    Abdominal ultrasound 01/28/2025  FINDINGS:  Liver: The liver is mildly enlarged and measures 18.6 cm.  No focal hepatic abnormality identified.     Gallbladder: There is cholelithiasis present.  No significant gallbladder wall thickening or hyperemia appreciated.  There is no significant pericholecystic fluid.  The technologist reports a positive sonographic Farooq sign.     Biliary system: The common duct is dilated, measuring 11-12 mm, slightly increased from prior ultrasound examination (previously 9-10 mm).  No discrete echogenic filling defect appreciated in the visualized portions of the common bile duct.  No significant intrahepatic biliary ductal dilatation appreciated.     Pancreas: Visualized portion appears within normal limits.     Right kidney: No evidence of hydronephrosis.     Miscellaneous: No upper abdominal ascites.     Impression:     Cholelithiasis with equivocal evaluation for acute cholecystitis.  The technologist reports a positive sonographic Farooq sign, although there " are no additional sonographic features to suggest acute cholecystitis at this time.  If there is strong clinical concern for acute cholecystitis, HIDA scan could be performed for more sensitive evaluation.  Consider surgical consultation/follow-up if not previously performed in light of recurrent symptomatology.     Dilated common bile duct measuring up to 11-12 mm, slightly increased in extent from prior examination.  No definite filling defect appreciated within the visualized common bile duct to suggest choledocholithiasis.  This remains of uncertain etiology.  If there is clinical concern for biliary obstruction, ERCP or MRCP may be performed as warranted.     Mild hepatomegaly.      MRCP 01/28/2025  FINDINGS:  Multiplanar noncontrast imaging was performed, utilizing magnetic resonance cholangiopancreatography protocol.     Several gallstones are noted, the largest measuring 17 mm in size.  No gallbladder wall thickening or pericholecystic edema is appreciated.     Intrahepatic biliary tree is nondilated.  Common bile duct is dilated at up to 9 mm.  There are no identifiable filling defects to indicate choledocholithiasis.  There is smoothly marginated tapered narrowing of the common bile duct approximately 2 cm above the MP along, likely physiologic.  The pancreatic duct is normal in caliber.     There is no evidence of hepatic mass or contour abnormality.  The spleen is normal in size and appearance.  The pancreas and adrenal glands are normal.  Tiny bilateral renal cysts are noted.  There is no free fluid.     Impression:     1. Gallstones.  2. Mild dilation of the common bile duct at up to 9 mm, unchanged compared to October 2024.  No evidence of choledocholithiasis.      EUS 01/28/2025  Findings:       ENDOSCOPIC FINDING: :        The examined esophagus was endoscopically normal.        The entire examined stomach was endoscopically normal. Slight        limited views due to adherent debris. 800cc fluid  aspirated.        The examined duodenum was endoscopically normal.        ENDOSONOGRAPHIC FINDING: :        Limited mediastinal exam unremarkable        Limited exam of liver unremarkable        There was no sign of significant endosonographic abnormality in the        entire pancreas. The pancreatic duct was regular in contour.        There was no sign of significant endosonographic abnormality in the        common bile duct. The maximum diameter of the duct was 10 mm. A        distended, thickened GB wall w/ numerous stones seen.        There was no sign of significant endosonographic/endos       ASSESSMENT & PLAN:  34 y.o. female with right upper quadrant and epigastric abdominal pain, gallstones, dilated common bile duct  -MRCP and EUS showed no obvious filling defect or common bile duct obstruction  -suspect symptoms related to acute cholecystitis or severe symptomatic cholelithiasis   -due to severity of symptoms will recommend cholecystectomy which will likely need to be performed tomorrow since the patient has had anesthesia today and could not consent  -okay for clear liquid diet but NPO at midnight

## 2025-01-28 NOTE — CARE UPDATE
34-year-old  female who has had 3 visits to the ER over the last several months for gallbladder issues  -Patient has right upper quadrant pain precipitated by eating fatty foods; starts out mild and gets progressively worse-> also has nausea with it  -In the ER she was afebrile and hemodynamically stable  -WBC was 18.19 with a normal T bill but AST was 41; she is not pregnant  -Does have evidence of a UTI with 13 WBC, moderate bacteria, and trace leukocytes  -Abdominal ultrasound Cholelithiasis with equivocal evaluation for acute cholecystitis.  Dilated common bile duct measuring up to 11-12 mm, slightly increased in extent   She received a dose of Dilaudid, a L of fluid, and was started on a regimen of Zosyn; pain is 0/10->MRCP is pending     Patient seen and examined. Answered all questions, plan of care discussed. NAD. Bradycardic with HR 40s.     MRCP 01/28/2025  FINDINGS:  Multiplanar noncontrast imaging was performed, utilizing magnetic resonance cholangiopancreatography protocol.     Several gallstones are noted, the largest measuring 17 mm in size.  No gallbladder wall thickening or pericholecystic edema is appreciated.     Intrahepatic biliary tree is nondilated.  Common bile duct is dilated at up to 9 mm.  There are no identifiable filling defects to indicate choledocholithiasis.  There is smoothly marginated tapered narrowing of the common bile duct approximately 2 cm above the MP along, likely physiologic.  The pancreatic duct is normal in caliber.     There is no evidence of hepatic mass or contour abnormality.  The spleen is normal in size and appearance.  The pancreas and adrenal glands are normal.  Tiny bilateral renal cysts are noted.  There is no free fluid.     Impression:     1. Gallstones.  2. Mild dilation of the common bile duct at up to 9 mm, unchanged compared to October 2024.  No evidence of choledocholithiasis.        EUS 01/28/2025  Findings:       ENDOSCOPIC FINDING: :         The examined esophagus was endoscopically normal.        The entire examined stomach was endoscopically normal. Slight        limited views due to adherent debris. 800cc fluid aspirated.        The examined duodenum was endoscopically normal.        ENDOSONOGRAPHIC FINDING: :        Limited mediastinal exam unremarkable        Limited exam of liver unremarkable        There was no sign of significant endosonographic abnormality in the        entire pancreas. The pancreatic duct was regular in contour.        There was no sign of significant endosonographic abnormality in the        common bile duct. The maximum diameter of the duct was 10 mm. A        distended, thickened GB wall w/ numerous stones seen.        There was no sign of significant endosonographic/endos        Surgery:   MRCP and EUS showed no obvious filling defect or common bile duct obstruction  -suspect symptoms related to acute cholecystitis or severe symptomatic cholelithiasis   -due to severity of symptoms will recommend cholecystectomy which will likely need to be performed tomorrow since the patient has had anesthesia today and could not consent    Assessment and Plan:  Cholelithiasis with acute cholecystitis   UTI  Bradycardia    -okay for clear liquid diet but NPO at midnight    -continue zosyn  -maintenance fluids  -IV pain control  Urine culture pending  -am labs  -tele monitoring  -echo ordered  -EKG prn  -monitor LFTs

## 2025-01-28 NOTE — ED NOTES
Patient ambulated to restroom independently with steady gait.  Positions self to comfort.  Plan of care discussed.  Call light within reach.  Bed in lowest position

## 2025-01-28 NOTE — PLAN OF CARE
[facility] - Emergency Dept  Initial Discharge Assessment       Primary Care Provider: No, Primary Doctor    Admission Diagnosis: Right upper quadrant pain [R10.11]    Admission Date: 1/27/2025  Expected Discharge Date:     Transition of Care Barriers: None    Payor: MEDICAID / [facility] [place] [facility] / Product Type: Managed Medicaid /     Extended Emergency Contact Information  Primary Emergency Contact: Timmy Mitchell  Home Phone: [phone]  Mobile Phone: [phone]  Relation: Significant other  Preferred language: English   needed? No    Discharge Plan A: Home with family  Discharge Plan B: Home      [id number] - [place] - [address] AT [facility] &   [address]  [place] [address]  Phone: [phone] Fax: [phone]      DC assessment completed with patient at bedside. Verified information on facesheet as correct. Denies POA. TERRELL is her mother. Added to emergency contact. Lives at listed address with her 3 dependent children. PCP is at [facility], reports last apt was about 6 months. Pharmacy is [facility] on [facility]. Denies hh/hd/blood thinners/op services/dme. Independent at baseline. Drives herself to apts. Boyfriend will provide transportation home upon DC. Reports taking home medications as prescribed and can currently afford them. Denies recent inpt stay in last 30 days. Verified insurance on file. DC plan is home.     Initial Assessment (most recent)       Adult Discharge Assessment - 01/28/25 1211          Discharge Assessment    Assessment Type Discharge Planning Assessment     Confirmed/corrected address, phone number and insurance Yes     Confirmed Demographics Correct on Facesheet     Source of Information patient     Does patient/caregiver understand observation status Yes     Communicated JAZLYN with patient/caregiver Yes     Reason For Admission abd pain     People in Home child(dee), dependent     Facility  Arrived From: home     Do you expect to return to your current living situation? Yes     Do you have help at home or someone to help you manage your care at home? Yes     Who are your caregiver(s) and their phone number(s)? SO     Prior to hospitilization cognitive status: Alert/Oriented     Current cognitive status: Alert/Oriented     Walking or Climbing Stairs Difficulty no     Dressing/Bathing Difficulty no     Equipment Currently Used at Home none     Readmission within 30 days? No     Patient currently being followed by outpatient case management? No     Do you currently have service(s) that help you manage your care at home? No     Do you take prescription medications? Yes     Do you have prescription coverage? Yes     Do you have any problems affording any of your prescribed medications? No     Is the patient taking medications as prescribed? yes     Who is going to help you get home at discharge? SO     How do you get to doctors appointments? family or friend will provide     Are you on dialysis? No     Do you take coumadin? No     Discharge Plan A Home with family     Discharge Plan B Home     DME Needed Upon Discharge  none     Discharge Plan discussed with: Patient     Transition of Care Barriers None

## 2025-01-29 ENCOUNTER — ANESTHESIA (OUTPATIENT)
Dept: SURGERY | Facility: HOSPITAL | Age: 35
DRG: 418 | End: 2025-01-29
Payer: MEDICAID

## 2025-01-29 ENCOUNTER — ANESTHESIA EVENT (OUTPATIENT)
Dept: SURGERY | Facility: HOSPITAL | Age: 35
DRG: 418 | End: 2025-01-29
Payer: MEDICAID

## 2025-01-29 ENCOUNTER — CLINICAL SUPPORT (OUTPATIENT)
Dept: CARDIOLOGY | Facility: HOSPITAL | Age: 35
DRG: 418 | End: 2025-01-29
Attending: EMERGENCY MEDICINE
Payer: MEDICAID

## 2025-01-29 VITALS — HEIGHT: 67 IN | BODY MASS INDEX: 34.21 KG/M2 | WEIGHT: 218 LBS

## 2025-01-29 LAB
ALBUMIN SERPL BCP-MCNC: 3.4 G/DL (ref 3.5–5.2)
ALBUMIN SERPL BCP-MCNC: 3.4 G/DL (ref 3.5–5.2)
ALP SERPL-CCNC: 58 U/L (ref 55–135)
ALP SERPL-CCNC: 58 U/L (ref 55–135)
ALT SERPL W/O P-5'-P-CCNC: 152 U/L (ref 10–44)
ALT SERPL W/O P-5'-P-CCNC: 152 U/L (ref 10–44)
ANION GAP SERPL CALC-SCNC: 6 MMOL/L (ref 8–16)
AORTIC ROOT ANNULUS: 3.1 CM
AORTIC VALVE CUSP SEPERATION: 2.2 CM
APICAL FOUR CHAMBER EJECTION FRACTION: 56 %
APICAL TWO CHAMBER EJECTION FRACTION: 51 %
ASCENDING AORTA: 2.3 CM
AST SERPL-CCNC: 92 U/L (ref 10–40)
AST SERPL-CCNC: 92 U/L (ref 10–40)
AV INDEX (PROSTH): 0.72
AV MEAN GRADIENT: 3 MMHG
AV PEAK GRADIENT: 6 MMHG
AV VALVE AREA BY VELOCITY RATIO: 2.4 CM²
AV VALVE AREA: 2.3 CM²
AV VELOCITY RATIO: 0.75
BACTERIA #/AREA URNS HPF: ABNORMAL /HPF
BACTERIA UR CULT: NORMAL
BACTERIA UR CULT: NORMAL
BASOPHILS # BLD AUTO: 0.05 K/UL (ref 0–0.2)
BASOPHILS NFR BLD: 0.6 % (ref 0–1.9)
BILIRUB DIRECT SERPL-MCNC: 0.8 MG/DL (ref 0.1–0.3)
BILIRUB SERPL-MCNC: 1.6 MG/DL (ref 0.1–1)
BILIRUB SERPL-MCNC: 1.6 MG/DL (ref 0.1–1)
BILIRUB UR QL STRIP: NEGATIVE
BSA FOR ECHO PROCEDURE: 2.16 M2
BUN SERPL-MCNC: 6 MG/DL (ref 6–20)
CALCIUM SERPL-MCNC: 8.5 MG/DL (ref 8.7–10.5)
CHLORIDE SERPL-SCNC: 106 MMOL/L (ref 95–110)
CLARITY UR: ABNORMAL
CO2 SERPL-SCNC: 26 MMOL/L (ref 23–29)
COLOR UR: YELLOW
CREAT SERPL-MCNC: 0.7 MG/DL (ref 0.5–1.4)
CV ECHO LV RWT: 0.34 CM
DIFFERENTIAL METHOD BLD: ABNORMAL
DOP CALC AO PEAK VEL: 1.2 M/S
DOP CALC AO VTI: 34 CM
DOP CALC LVOT AREA: 3.1 CM2
DOP CALC LVOT DIAMETER: 2 CM
DOP CALC LVOT PEAK VEL: 0.9 M/S
DOP CALC LVOT STROKE VOLUME: 77.2 CM3
DOP CALC MV VTI: 35.4 CM
DOP CALCLVOT PEAK VEL VTI: 24.6 CM
E WAVE DECELERATION TIME: 174 MSEC
E/A RATIO: 2.36
E/E' RATIO: 8 M/S
ECHO LV POSTERIOR WALL: 0.8 CM (ref 0.6–1.1)
EOSINOPHIL # BLD AUTO: 0.2 K/UL (ref 0–0.5)
EOSINOPHIL NFR BLD: 2.3 % (ref 0–8)
ERYTHROCYTE [DISTWIDTH] IN BLOOD BY AUTOMATED COUNT: 13.2 % (ref 11.5–14.5)
EST. GFR  (NO RACE VARIABLE): >60 ML/MIN/1.73 M^2
FRACTIONAL SHORTENING: 36.2 % (ref 28–44)
GLUCOSE SERPL-MCNC: 80 MG/DL (ref 70–110)
GLUCOSE UR QL STRIP: NEGATIVE
HCT VFR BLD AUTO: 33.4 % (ref 37–48.5)
HGB BLD-MCNC: 11 G/DL (ref 12–16)
HGB UR QL STRIP: ABNORMAL
HYALINE CASTS #/AREA URNS LPF: 0 /LPF
IMM GRANULOCYTES # BLD AUTO: 0.02 K/UL (ref 0–0.04)
IMM GRANULOCYTES NFR BLD AUTO: 0.2 % (ref 0–0.5)
INTERVENTRICULAR SEPTUM: 0.7 CM (ref 0.6–1.1)
IVC DIAMETER: 1.9 CM
KETONES UR QL STRIP: ABNORMAL
LEFT ATRIUM AREA SYSTOLIC (APICAL 2 CHAMBER): 12.9 CM2
LEFT ATRIUM AREA SYSTOLIC (APICAL 4 CHAMBER): 14.3 CM2
LEFT ATRIUM SIZE: 3.5 CM
LEFT ATRIUM VOLUME INDEX MOD: 15 ML/M2
LEFT ATRIUM VOLUME MOD: 32 ML
LEFT INTERNAL DIMENSION IN SYSTOLE: 3 CM (ref 2.1–4)
LEFT VENTRICLE DIASTOLIC VOLUME INDEX: 49.52 ML/M2
LEFT VENTRICLE DIASTOLIC VOLUME: 104 ML
LEFT VENTRICLE END DIASTOLIC VOLUME APICAL 2 CHAMBER: 70.5 ML
LEFT VENTRICLE END DIASTOLIC VOLUME APICAL 4 CHAMBER: 95.2 ML
LEFT VENTRICLE END SYSTOLIC VOLUME APICAL 2 CHAMBER: 29.4 ML
LEFT VENTRICLE END SYSTOLIC VOLUME APICAL 4 CHAMBER: 31 ML
LEFT VENTRICLE MASS INDEX: 53.6 G/M2
LEFT VENTRICLE SYSTOLIC VOLUME INDEX: 16.4 ML/M2
LEFT VENTRICLE SYSTOLIC VOLUME: 34.4 ML
LEFT VENTRICULAR INTERNAL DIMENSION IN DIASTOLE: 4.7 CM (ref 3.5–6)
LEFT VENTRICULAR MASS: 112.5 G
LEUKOCYTE ESTERASE UR QL STRIP: ABNORMAL
LV LATERAL E/E' RATIO: 6.1 M/S
LV SEPTAL E/E' RATIO: 10.2 M/S
LVED V (TEICH): 104 ML
LVES V (TEICH): 34.4 ML
LVOT MG: 2 MMHG
LVOT MV: 0.58 CM/S
LYMPHOCYTES # BLD AUTO: 3.2 K/UL (ref 1–4.8)
LYMPHOCYTES NFR BLD: 34.7 % (ref 18–48)
MAGNESIUM SERPL-MCNC: 1.5 MG/DL (ref 1.6–2.6)
MCH RBC QN AUTO: 33 PG (ref 27–31)
MCHC RBC AUTO-ENTMCNC: 32.9 G/DL (ref 32–36)
MCV RBC AUTO: 100 FL (ref 82–98)
MICROSCOPIC COMMENT: ABNORMAL
MONOCYTES # BLD AUTO: 0.5 K/UL (ref 0.3–1)
MONOCYTES NFR BLD: 5.7 % (ref 4–15)
MV MEAN GRADIENT: 1 MMHG
MV PEAK A VEL: 0.39 M/S
MV PEAK E VEL: 0.92 M/S
MV PEAK GRADIENT: 3 MMHG
MV STENOSIS PRESSURE HALF TIME: 83 MS
MV VALVE AREA BY CONTINUITY EQUATION: 2.18 CM2
MV VALVE AREA P 1/2 METHOD: 2.65 CM2
NEUTROPHILS # BLD AUTO: 5.1 K/UL (ref 1.8–7.7)
NEUTROPHILS NFR BLD: 56.5 % (ref 38–73)
NITRITE UR QL STRIP: NEGATIVE
NRBC BLD-RTO: 0 /100 WBC
OHS LV EJECTION FRACTION SIMPSONS BIPLANE MOD: 54 %
PH UR STRIP: 6 [PH] (ref 5–8)
PISA TR MAX VEL: 2.2 M/S
PLATELET # BLD AUTO: 244 K/UL (ref 150–450)
PMV BLD AUTO: 11.4 FL (ref 9.2–12.9)
POTASSIUM SERPL-SCNC: 3.6 MMOL/L (ref 3.5–5.1)
PROT SERPL-MCNC: 5.6 G/DL (ref 6–8.4)
PROT SERPL-MCNC: 5.6 G/DL (ref 6–8.4)
PROT UR QL STRIP: ABNORMAL
PV MV: 0.57 M/S
PV PEAK GRADIENT: 3 MMHG
PV PEAK VELOCITY: 0.87 M/S
RA PRESSURE ESTIMATED: 3 MMHG
RBC # BLD AUTO: 3.33 M/UL (ref 4–5.4)
RBC #/AREA URNS HPF: >100 /HPF (ref 0–4)
RIGHT ATRIUM VOLUME AREA LENGTH APICAL 4 CHAMBER: 45.2 ML
RV TB RVSP: 5 MMHG
RV TISSUE DOPPLER FREE WALL SYSTOLIC VELOCITY 1 (APICAL 4 CHAMBER VIEW): 11.5 CM/S
SINUS: 2.75 CM
SODIUM SERPL-SCNC: 138 MMOL/L (ref 136–145)
SP GR UR STRIP: 1.02 (ref 1–1.03)
SQUAMOUS #/AREA URNS HPF: 12 /HPF
STJ: 2.68 CM
TDI LATERAL: 0.15 M/S
TDI SEPTAL: 0.09 M/S
TDI: 0.12 M/S
TR MAX PG: 19 MMHG
TRICUSPID ANNULAR PLANE SYSTOLIC EXCURSION: 2.85 CM
TV REST PULMONARY ARTERY PRESSURE: 22 MMHG
URN SPEC COLLECT METH UR: ABNORMAL
UROBILINOGEN UR STRIP-ACNC: NEGATIVE EU/DL
WBC # BLD AUTO: 9.08 K/UL (ref 3.9–12.7)
WBC #/AREA URNS HPF: 65 /HPF (ref 0–5)
Z-SCORE OF LEFT VENTRICULAR DIMENSION IN END DIASTOLE: -3.29
Z-SCORE OF LEFT VENTRICULAR DIMENSION IN END SYSTOLE: -2.25

## 2025-01-29 PROCEDURE — 63600175 PHARM REV CODE 636 W HCPCS: Performed by: NURSE ANESTHETIST, CERTIFIED REGISTERED

## 2025-01-29 PROCEDURE — 25000003 PHARM REV CODE 250: Performed by: SURGERY

## 2025-01-29 PROCEDURE — 71000033 HC RECOVERY, INTIAL HOUR: Performed by: SURGERY

## 2025-01-29 PROCEDURE — 37000009 HC ANESTHESIA EA ADD 15 MINS: Performed by: SURGERY

## 2025-01-29 PROCEDURE — 81001 URINALYSIS AUTO W/SCOPE: CPT

## 2025-01-29 PROCEDURE — 63600175 PHARM REV CODE 636 W HCPCS: Performed by: ANESTHESIOLOGY

## 2025-01-29 PROCEDURE — 25000003 PHARM REV CODE 250: Performed by: NURSE ANESTHETIST, CERTIFIED REGISTERED

## 2025-01-29 PROCEDURE — 63600175 PHARM REV CODE 636 W HCPCS

## 2025-01-29 PROCEDURE — D9220A PRA ANESTHESIA: Mod: CRNA,,, | Performed by: NURSE ANESTHETIST, CERTIFIED REGISTERED

## 2025-01-29 PROCEDURE — D9220A PRA ANESTHESIA: Mod: ANES,,, | Performed by: ANESTHESIOLOGY

## 2025-01-29 PROCEDURE — 27201423 OPTIME MED/SURG SUP & DEVICES STERILE SUPPLY: Performed by: SURGERY

## 2025-01-29 PROCEDURE — 87086 URINE CULTURE/COLONY COUNT: CPT

## 2025-01-29 PROCEDURE — 0FT44ZZ RESECTION OF GALLBLADDER, PERCUTANEOUS ENDOSCOPIC APPROACH: ICD-10-PCS | Performed by: SURGERY

## 2025-01-29 PROCEDURE — 63600175 PHARM REV CODE 636 W HCPCS: Mod: JZ,TB | Performed by: SURGERY

## 2025-01-29 PROCEDURE — 12000002 HC ACUTE/MED SURGE SEMI-PRIVATE ROOM

## 2025-01-29 PROCEDURE — 25000003 PHARM REV CODE 250: Performed by: ANESTHESIOLOGY

## 2025-01-29 PROCEDURE — 94761 N-INVAS EAR/PLS OXIMETRY MLT: CPT

## 2025-01-29 PROCEDURE — 93306 TTE W/DOPPLER COMPLETE: CPT | Mod: 26,,, | Performed by: INTERNAL MEDICINE

## 2025-01-29 PROCEDURE — 63600175 PHARM REV CODE 636 W HCPCS: Performed by: INTERNAL MEDICINE

## 2025-01-29 PROCEDURE — 80053 COMPREHEN METABOLIC PANEL: CPT | Performed by: INTERNAL MEDICINE

## 2025-01-29 PROCEDURE — 85025 COMPLETE CBC W/AUTO DIFF WBC: CPT | Performed by: INTERNAL MEDICINE

## 2025-01-29 PROCEDURE — 47562 LAPAROSCOPIC CHOLECYSTECTOMY: CPT | Mod: ,,, | Performed by: SURGERY

## 2025-01-29 PROCEDURE — 80076 HEPATIC FUNCTION PANEL: CPT | Performed by: NURSE PRACTITIONER

## 2025-01-29 PROCEDURE — 25000003 PHARM REV CODE 250: Performed by: INTERNAL MEDICINE

## 2025-01-29 PROCEDURE — 93306 TTE W/DOPPLER COMPLETE: CPT

## 2025-01-29 PROCEDURE — 83735 ASSAY OF MAGNESIUM: CPT | Performed by: INTERNAL MEDICINE

## 2025-01-29 PROCEDURE — 36000708 HC OR TIME LEV III 1ST 15 MIN: Performed by: SURGERY

## 2025-01-29 PROCEDURE — 36415 COLL VENOUS BLD VENIPUNCTURE: CPT | Performed by: INTERNAL MEDICINE

## 2025-01-29 PROCEDURE — 37000008 HC ANESTHESIA 1ST 15 MINUTES: Performed by: SURGERY

## 2025-01-29 PROCEDURE — G0378 HOSPITAL OBSERVATION PER HR: HCPCS

## 2025-01-29 PROCEDURE — 36000709 HC OR TIME LEV III EA ADD 15 MIN: Performed by: SURGERY

## 2025-01-29 PROCEDURE — 63600175 PHARM REV CODE 636 W HCPCS: Performed by: SURGERY

## 2025-01-29 PROCEDURE — 63600175 PHARM REV CODE 636 W HCPCS: Performed by: NURSE PRACTITIONER

## 2025-01-29 RX ORDER — SCOPOLAMINE 1 MG/3D
1 PATCH, EXTENDED RELEASE TRANSDERMAL ONCE
Status: DISCONTINUED | OUTPATIENT
Start: 2025-01-29 | End: 2025-01-31 | Stop reason: HOSPADM

## 2025-01-29 RX ORDER — PROPOFOL 10 MG/ML
VIAL (ML) INTRAVENOUS
Status: DISCONTINUED | OUTPATIENT
Start: 2025-01-29 | End: 2025-01-29

## 2025-01-29 RX ORDER — ROCURONIUM BROMIDE 10 MG/ML
INJECTION, SOLUTION INTRAVENOUS
Status: DISCONTINUED | OUTPATIENT
Start: 2025-01-29 | End: 2025-01-29

## 2025-01-29 RX ORDER — LIDOCAINE HYDROCHLORIDE 20 MG/ML
JELLY TOPICAL
Status: DISCONTINUED | OUTPATIENT
Start: 2025-01-29 | End: 2025-01-29

## 2025-01-29 RX ORDER — ONDANSETRON HYDROCHLORIDE 2 MG/ML
4 INJECTION, SOLUTION INTRAVENOUS DAILY PRN
Status: DISCONTINUED | OUTPATIENT
Start: 2025-01-29 | End: 2025-01-29 | Stop reason: HOSPADM

## 2025-01-29 RX ORDER — DIPHENHYDRAMINE HYDROCHLORIDE 50 MG/ML
INJECTION INTRAMUSCULAR; INTRAVENOUS
Status: DISCONTINUED | OUTPATIENT
Start: 2025-01-29 | End: 2025-01-29

## 2025-01-29 RX ORDER — ACETAMINOPHEN 10 MG/ML
INJECTION, SOLUTION INTRAVENOUS
Status: DISCONTINUED | OUTPATIENT
Start: 2025-01-29 | End: 2025-01-29

## 2025-01-29 RX ORDER — DEXAMETHASONE SODIUM PHOSPHATE 4 MG/ML
INJECTION, SOLUTION INTRA-ARTICULAR; INTRALESIONAL; INTRAMUSCULAR; INTRAVENOUS; SOFT TISSUE
Status: DISCONTINUED | OUTPATIENT
Start: 2025-01-29 | End: 2025-01-29

## 2025-01-29 RX ORDER — LIDOCAINE HYDROCHLORIDE 20 MG/ML
INJECTION INTRAVENOUS
Status: DISCONTINUED | OUTPATIENT
Start: 2025-01-29 | End: 2025-01-29

## 2025-01-29 RX ORDER — OXYCODONE HYDROCHLORIDE 5 MG/1
5 TABLET ORAL
Status: DISCONTINUED | OUTPATIENT
Start: 2025-01-29 | End: 2025-01-29 | Stop reason: HOSPADM

## 2025-01-29 RX ORDER — ONDANSETRON HYDROCHLORIDE 2 MG/ML
INJECTION, SOLUTION INTRAVENOUS
Status: DISCONTINUED | OUTPATIENT
Start: 2025-01-29 | End: 2025-01-29

## 2025-01-29 RX ORDER — HYDROMORPHONE HYDROCHLORIDE 1 MG/ML
0.2 INJECTION, SOLUTION INTRAMUSCULAR; INTRAVENOUS; SUBCUTANEOUS EVERY 5 MIN PRN
Status: DISCONTINUED | OUTPATIENT
Start: 2025-01-29 | End: 2025-01-29 | Stop reason: HOSPADM

## 2025-01-29 RX ORDER — FENTANYL CITRATE 50 UG/ML
INJECTION, SOLUTION INTRAMUSCULAR; INTRAVENOUS
Status: DISCONTINUED | OUTPATIENT
Start: 2025-01-29 | End: 2025-01-29

## 2025-01-29 RX ORDER — FAMOTIDINE 10 MG/ML
INJECTION INTRAVENOUS
Status: DISCONTINUED | OUTPATIENT
Start: 2025-01-29 | End: 2025-01-29

## 2025-01-29 RX ORDER — BUPIVACAINE 13.3 MG/ML
INJECTION, SUSPENSION, LIPOSOMAL INFILTRATION
Status: DISCONTINUED | OUTPATIENT
Start: 2025-01-29 | End: 2025-01-29 | Stop reason: HOSPADM

## 2025-01-29 RX ORDER — DIPHENHYDRAMINE HYDROCHLORIDE 50 MG/ML
12.5 INJECTION INTRAMUSCULAR; INTRAVENOUS
Status: DISCONTINUED | OUTPATIENT
Start: 2025-01-29 | End: 2025-01-29 | Stop reason: HOSPADM

## 2025-01-29 RX ORDER — MIDAZOLAM HYDROCHLORIDE 1 MG/ML
INJECTION INTRAMUSCULAR; INTRAVENOUS
Status: DISCONTINUED | OUTPATIENT
Start: 2025-01-29 | End: 2025-01-29

## 2025-01-29 RX ORDER — MEPERIDINE HYDROCHLORIDE 50 MG/ML
12.5 INJECTION INTRAMUSCULAR; INTRAVENOUS; SUBCUTANEOUS EVERY 10 MIN PRN
Status: COMPLETED | OUTPATIENT
Start: 2025-01-29 | End: 2025-01-29

## 2025-01-29 RX ORDER — BUPIVACAINE HYDROCHLORIDE AND EPINEPHRINE 2.5; 5 MG/ML; UG/ML
INJECTION, SOLUTION EPIDURAL; INFILTRATION; INTRACAUDAL; PERINEURAL
Status: DISCONTINUED | OUTPATIENT
Start: 2025-01-29 | End: 2025-01-29 | Stop reason: HOSPADM

## 2025-01-29 RX ORDER — GLUCAGON 1 MG
1 KIT INJECTION
Status: DISCONTINUED | OUTPATIENT
Start: 2025-01-29 | End: 2025-01-29 | Stop reason: HOSPADM

## 2025-01-29 RX ORDER — MAGNESIUM SULFATE HEPTAHYDRATE 40 MG/ML
2 INJECTION, SOLUTION INTRAVENOUS ONCE
Status: COMPLETED | OUTPATIENT
Start: 2025-01-29 | End: 2025-01-29

## 2025-01-29 RX ADMIN — HYDROCODONE BITARTRATE AND ACETAMINOPHEN 1 TABLET: 5; 325 TABLET ORAL at 10:01

## 2025-01-29 RX ADMIN — MEPERIDINE HYDROCHLORIDE 12.5 MG: 50 INJECTION INTRAMUSCULAR; INTRAVENOUS; SUBCUTANEOUS at 08:01

## 2025-01-29 RX ADMIN — MAGNESIUM SULFATE HEPTAHYDRATE 2 G: 40 INJECTION, SOLUTION INTRAVENOUS at 02:01

## 2025-01-29 RX ADMIN — SODIUM CHLORIDE: 0.9 INJECTION, SOLUTION INTRAVENOUS at 07:01

## 2025-01-29 RX ADMIN — DIPHENHYDRAMINE HYDROCHLORIDE 6.25 MG: 50 INJECTION INTRAMUSCULAR; INTRAVENOUS at 07:01

## 2025-01-29 RX ADMIN — Medication 6 MG: at 01:01

## 2025-01-29 RX ADMIN — PIPERACILLIN SODIUM AND TAZOBACTAM SODIUM 3.38 G: 3; .375 INJECTION, POWDER, FOR SOLUTION INTRAVENOUS at 06:01

## 2025-01-29 RX ADMIN — PANTOPRAZOLE SODIUM 40 MG: 40 INJECTION, POWDER, FOR SOLUTION INTRAVENOUS at 08:01

## 2025-01-29 RX ADMIN — HYDROCODONE BITARTRATE AND ACETAMINOPHEN 1 TABLET: 5; 325 TABLET ORAL at 06:01

## 2025-01-29 RX ADMIN — PIPERACILLIN SODIUM AND TAZOBACTAM SODIUM 3.38 G: 3; .375 INJECTION, POWDER, FOR SOLUTION INTRAVENOUS at 01:01

## 2025-01-29 RX ADMIN — FENTANYL CITRATE 100 MCG: 50 INJECTION, SOLUTION INTRAMUSCULAR; INTRAVENOUS at 07:01

## 2025-01-29 RX ADMIN — DEXAMETHASONE SODIUM PHOSPHATE 8 MG: 4 INJECTION, SOLUTION INTRAMUSCULAR; INTRAVENOUS at 07:01

## 2025-01-29 RX ADMIN — MIDAZOLAM HYDROCHLORIDE 2 MG: 1 INJECTION, SOLUTION INTRAMUSCULAR; INTRAVENOUS at 07:01

## 2025-01-29 RX ADMIN — LIDOCAINE HYDROCHLORIDE 75 MG: 20 INJECTION, SOLUTION INTRAVENOUS at 07:01

## 2025-01-29 RX ADMIN — PROPOFOL 140 MG: 10 INJECTION, EMULSION INTRAVENOUS at 07:01

## 2025-01-29 RX ADMIN — SUGAMMADEX 200 MG: 100 INJECTION, SOLUTION INTRAVENOUS at 08:01

## 2025-01-29 RX ADMIN — MORPHINE SULFATE 2 MG: 2 INJECTION, SOLUTION INTRAMUSCULAR; INTRAVENOUS at 05:01

## 2025-01-29 RX ADMIN — ONDANSETRON 4 MG: 2 INJECTION INTRAMUSCULAR; INTRAVENOUS at 07:01

## 2025-01-29 RX ADMIN — ROCURONIUM BROMIDE 40 MG: 10 INJECTION, SOLUTION INTRAVENOUS at 07:01

## 2025-01-29 RX ADMIN — GLYCOPYRROLATE 0.3 MG: 0.2 INJECTION, SOLUTION INTRAMUSCULAR; INTRAVITREAL at 07:01

## 2025-01-29 RX ADMIN — ACETAMINOPHEN 1000 MG: 10 INJECTION, SOLUTION INTRAVENOUS at 07:01

## 2025-01-29 RX ADMIN — Medication 6 MG: at 10:01

## 2025-01-29 RX ADMIN — OXYCODONE HYDROCHLORIDE 5 MG: 5 TABLET ORAL at 08:01

## 2025-01-29 RX ADMIN — SCOPOLAMINE 1 PATCH: 1.5 PATCH, EXTENDED RELEASE TRANSDERMAL at 07:01

## 2025-01-29 RX ADMIN — LIDOCAINE HYDROCHLORIDE 3 ML: 20 JELLY TOPICAL at 07:01

## 2025-01-29 RX ADMIN — MORPHINE SULFATE 2 MG: 2 INJECTION, SOLUTION INTRAMUSCULAR; INTRAVENOUS at 12:01

## 2025-01-29 RX ADMIN — PIPERACILLIN SODIUM AND TAZOBACTAM SODIUM 3.38 G: 3; .375 INJECTION, POWDER, FOR SOLUTION INTRAVENOUS at 08:01

## 2025-01-29 RX ADMIN — HYDROCODONE BITARTRATE AND ACETAMINOPHEN 1 TABLET: 5; 325 TABLET ORAL at 01:01

## 2025-01-29 RX ADMIN — FAMOTIDINE 20 MG: 10 INJECTION, SOLUTION INTRAVENOUS at 07:01

## 2025-01-29 RX ADMIN — ROCURONIUM BROMIDE 10 MG: 10 INJECTION, SOLUTION INTRAVENOUS at 07:01

## 2025-01-29 RX ADMIN — PANTOPRAZOLE SODIUM 40 MG: 40 INJECTION, POWDER, FOR SOLUTION INTRAVENOUS at 10:01

## 2025-01-29 NOTE — ANESTHESIA POSTPROCEDURE EVALUATION
Anesthesia Post Evaluation    Patient: Muriel Vasquez    Procedure(s) Performed: Procedure(s) (LRB):  CHOLECYSTECTOMY, LAPAROSCOPIC (N/A)    Final Anesthesia Type: general      Patient location during evaluation: PACU  Patient participation: Yes- Able to Participate  Level of consciousness: awake and alert  Post-procedure vital signs: reviewed and stable  Pain management: adequate  Airway patency: patent    PONV status at discharge: No PONV  Anesthetic complications: no      Cardiovascular status: stable  Respiratory status: unassisted and spontaneous ventilation  Hydration status: euvolemic  Follow-up not needed.              Vitals Value Taken Time   /88 01/29/25 0949   Temp 36.6 °C (97.8 °F) 01/29/25 0949   Pulse 52 01/29/25 0949   Resp 18 01/29/25 1019   SpO2 99 % 01/29/25 0949         Event Time   Out of Recovery 01/29/2025 09:23:54         Pain/Ashley Score: Pain Rating Prior to Med Admin: 5 (1/29/2025 10:19 AM)  Pain Rating Post Med Admin: 3 (1/29/2025  2:47 AM)  Ashley Score: 9 (1/29/2025  9:15 AM)

## 2025-01-29 NOTE — TRANSFER OF CARE
"Anesthesia Transfer of Care Note    Patient: Muriel Vasquez    Procedure(s) Performed: Procedure(s) (LRB):  CHOLECYSTECTOMY, LAPAROSCOPIC (N/A)    Patient location: PACU    Anesthesia Type: general    Transport from OR: Transported from OR on room air with adequate spontaneous ventilation    Post pain: adequate analgesia    Post assessment: no apparent anesthetic complications    Post vital signs: stable    Level of consciousness: awake and alert    Nausea/Vomiting: no nausea/vomiting    Complications: none    Transfer of care protocol was followed      Last vitals: Visit Vitals  /73   Pulse (!) 48   Temp 36.7 °C (98 °F) (Oral)   Resp 18   Ht 5' 7" (1.702 m)   Wt 98.9 kg (218 lb 0.6 oz)   LMP 01/26/2025 (Exact Date)   SpO2 97%   Breastfeeding No   BMI 34.15 kg/m²     "

## 2025-01-29 NOTE — PROGRESS NOTES
"[facility]  Progress Note    Patient Name: Muriel Vasquez  MRN: [id number]  Patient Class: OP- Observation   Admission Date: 1/27/2025  Length of Stay: 0 days  Attending Physician: Sheryl Guardado DO  Primary Care Provider: [facility]        Subjective     Principal Problem:Cholelithiasis with acute cholecystitis        HPI:  Otherwise healthy 34-year-old  female who has had 3 visits to the ER over the last several months for gallbladder issues  Patient has right upper quadrant pain precipitated by eating fatty foods.  It starts out mild and gets progressively worse; she also has nausea with it  In the ER she was afebrile and hemodynamically stable  WBC was 18.19 with a normal T bill but AST was 41; she is not pregnant  Does have evidence of a UTI with 13 WBC, moderate bacteria, and trace leukocytes  Abdominal ultrasound showed per radiologist Dr. Goyo Gallego:    "Impression:     Cholelithiasis with equivocal evaluation for acute cholecystitis.  The technologist reports a positive sonographic Farooq sign, although there are no additional sonographic features to suggest acute cholecystitis at this time.  If there is strong clinical concern for acute cholecystitis, HIDA scan could be performed for more sensitive evaluation.  Consider surgical consultation/follow-up if not previously performed in light of recurrent symptomatology.     Dilated common bile duct measuring up to 11-12 mm, slightly increased in extent from prior examination.  No definite filling defect appreciated within the visualized common bile duct to suggest choledocholithiasis.  This remains of uncertain etiology.  If there is clinical concern for biliary obstruction, ERCP or MRCP may be performed as warranted.     Mild hepatomegaly.     This report was flagged in Epic as abnormal."    She received a dose of Dilaudid, a L of fluid, and was started on a regimen of Zosyn; pain is 0/10->MRCP is " pending    Overview/Hospital Course:  No notes on file    Interval History:  Patient was seen and examined.  Overnight notes reviewed.  Patient examined postoperatively.  She reports right-sided abdominal pain.  Noted to have elevated bilirubin and LFTs on a.m. labs.  GI consulted with plan for ERCP tomorrow.  Continue Zosyn.  NPO at midnight.  Magnesium noted to be low on a.m. labs and replacements ordered.  General surgery following.    Review of Systems   Respiratory:  Negative for shortness of breath.    Cardiovascular:  Negative for chest pain.   Gastrointestinal:  Positive for abdominal pain. Negative for nausea and vomiting.     Objective:     Vital Signs (Most Recent):  Temp: 97.8 °F (36.6 °C) (01/29/25 0949)  Pulse: (!) 52 (01/29/25 0949)  Resp: 18 (01/29/25 1249)  BP: 133/88 (01/29/25 0949)  SpO2: 99 % (01/29/25 0949) Vital Signs (24h Range):  Temp:  [96.9 °F (36.1 °C)-98.3 °F (36.8 °C)] 97.8 °F (36.6 °C)  Pulse:  [] 52  Resp:  [13-39] 18  SpO2:  [93 %-100 %] 99 %  BP: (104-177)/(59-88) 133/88     Weight: 98.9 kg (218 lb 0.6 oz)  Body mass index is 34.15 kg/m².    Intake/Output Summary (Last 24 hours) at 1/29/2025 1344  Last data filed at 1/29/2025 0838  Gross per 24 hour   Intake 1700 ml   Output 30 ml   Net 1670 ml         Physical Exam  Vitals and nursing note reviewed.   Constitutional:       General: She is not in acute distress.     Appearance: Normal appearance. She is obese.   HENT:      Head: Normocephalic and atraumatic.      Mouth/Throat:      Mouth: Mucous membranes are moist.      Pharynx: Oropharynx is clear.   Eyes:      Extraocular Movements: Extraocular movements intact.   Cardiovascular:      Rate and Rhythm: Regular rhythm. Bradycardia present.   Pulmonary:      Effort: Pulmonary effort is normal.      Breath sounds: Normal breath sounds.   Abdominal:      General: Abdomen is flat.      Palpations: Abdomen is soft.      Tenderness: There is generalized abdominal tenderness. There  is no guarding or rebound.   Musculoskeletal:      Cervical back: Normal range of motion and neck supple.   Skin:     General: Skin is warm.   Neurological:      General: No focal deficit present.      Mental Status: She is alert and oriented to person, place, and time. Mental status is at baseline.   Psychiatric:         Mood and Affect: Mood normal.         Behavior: Behavior normal.             Significant Labs: All pertinent labs within the past 24 hours have been reviewed.  CBC:   Recent Labs   Lab 01/28/25  0039 01/29/25  0524   WBC 18.19* 9.08   HGB 13.0 11.0*   HCT 39.6 33.4*    244     CMP:   Recent Labs   Lab 01/28/25 0039 01/29/25 0524    138   K 4.2 3.6    106   CO2 29 26    80   BUN 8 6   CREATININE 0.8 0.7   CALCIUM 10.1 8.5*   PROT 7.4 5.6*  5.6*   ALBUMIN 4.5 3.4*  3.4*   BILITOT 0.7 1.6*  1.6*   ALKPHOS 53* 58  58   AST 41* 92*  92*   ALT 22 152*  152*   ANIONGAP 6* 6*     Magnesium:   Recent Labs   Lab 01/29/25 0524   MG 1.5*       Significant Imaging: I have reviewed all pertinent imaging results/findings within the past 24 hours.    Assessment and Plan     * Cholelithiasis with acute cholecystitis    No definitive radiographic evidence of cholecystitis, but clinically presents as such  Concern for dilated common bile duct; concern for possible concomitant UTI; for now:  NPO  Maintenance fluids  Continue Zosyn  Zofran  Prn IV Morphine  MRCP pending-> depending on findings, may need GI consult  Surgery consult, for consideration for cholecystectomy  Urine culture with multiple organisms in predominance; repeat UA ordered  1/29:  Noted to have elevated bilirubin and LFTs; GI consulted with plan for ERCP tomorrow; continue Zosyn. ; CLD today with NPO at midnight; general surgery following      VTE Risk Mitigation (From admission, onward)           Ordered     IP VTE HIGH RISK PATIENT  Once         01/28/25 0556     Place sequential compression device  Until  discontinued         01/28/25 0556                    Discharge Planning   JAZLYN: 1/30/2025     Code Status: Full Code   Medical Readiness for Discharge Date:   Discharge Plan A: Home with family                        Sheri Hernandez PA-C  Department of Hospital Medicine   Count includes the [facility]

## 2025-01-29 NOTE — PLAN OF CARE
Nursing Transfer Note      Reason patient is being transferred: Released from PACU    Transfer To: Room 3111    Transfer via stretcher    Transported by Susanna    Medicines sent: None    Any special needs or follow-up needed: None    Chart send with patient: Yes    Notified: spouse by text messaging  Nurse Ann at bedside for handoff report

## 2025-01-29 NOTE — ASSESSMENT & PLAN NOTE
No definitive radiographic evidence of cholecystitis, but clinically presents as such  Concern for dilated common bile duct; concern for possible concomitant UTI; for now:  NPO  Maintenance fluids  Continue Zosyn  Zofran  Prn IV Morphine  MRCP pending-> depending on findings, may need GI consult  Surgery consult, for consideration for cholecystectomy  Urine culture with multiple organisms in predominance; repeat UA ordered  1/29:  Noted to have elevated bilirubin and LFTs; GI consulted with plan for ERCP tomorrow; continue Zosyn. ; CLD today with NPO at midnight; general surgery following

## 2025-01-29 NOTE — SUBJECTIVE & OBJECTIVE
Interval History:  Patient was seen and examined.  Overnight notes reviewed.  Patient examined postoperatively.  She reports right-sided abdominal pain.  Noted to have elevated bilirubin and LFTs on a.m. labs.  GI consulted with plan for ERCP tomorrow.  Continue Zosyn.  NPO at midnight.  Magnesium noted to be low on a.m. labs and replacements ordered.  General surgery following.    Review of Systems   Respiratory:  Negative for shortness of breath.    Cardiovascular:  Negative for chest pain.   Gastrointestinal:  Positive for abdominal pain. Negative for nausea and vomiting.     Objective:     Vital Signs (Most Recent):  Temp: 97.8 °F (36.6 °C) (01/29/25 0949)  Pulse: (!) 52 (01/29/25 0949)  Resp: 18 (01/29/25 1249)  BP: 133/88 (01/29/25 0949)  SpO2: 99 % (01/29/25 0949) Vital Signs (24h Range):  Temp:  [96.9 °F (36.1 °C)-98.3 °F (36.8 °C)] 97.8 °F (36.6 °C)  Pulse:  [] 52  Resp:  [13-39] 18  SpO2:  [93 %-100 %] 99 %  BP: (104-177)/(59-88) 133/88     Weight: 98.9 kg (218 lb 0.6 oz)  Body mass index is 34.15 kg/m².    Intake/Output Summary (Last 24 hours) at 1/29/2025 1344  Last data filed at 1/29/2025 0838  Gross per 24 hour   Intake 1700 ml   Output 30 ml   Net 1670 ml         Physical Exam  Vitals and nursing note reviewed.   Constitutional:       General: She is not in acute distress.     Appearance: Normal appearance. She is obese.   HENT:      Head: Normocephalic and atraumatic.      Mouth/Throat:      Mouth: Mucous membranes are moist.      Pharynx: Oropharynx is clear.   Eyes:      Extraocular Movements: Extraocular movements intact.   Cardiovascular:      Rate and Rhythm: Regular rhythm. Bradycardia present.   Pulmonary:      Effort: Pulmonary effort is normal.      Breath sounds: Normal breath sounds.   Abdominal:      General: Abdomen is flat.      Palpations: Abdomen is soft.      Tenderness: There is generalized abdominal tenderness. There is no guarding or rebound.   Musculoskeletal:      Cervical  back: Normal range of motion and neck supple.   Skin:     General: Skin is warm.   Neurological:      General: No focal deficit present.      Mental Status: She is alert and oriented to person, place, and time. Mental status is at baseline.   Psychiatric:         Mood and Affect: Mood normal.         Behavior: Behavior normal.             Significant Labs: All pertinent labs within the past 24 hours have been reviewed.  CBC:   Recent Labs   Lab 01/28/25 0039 01/29/25 0524   WBC 18.19* 9.08   HGB 13.0 11.0*   HCT 39.6 33.4*    244     CMP:   Recent Labs   Lab 01/28/25 0039 01/29/25 0524    138   K 4.2 3.6    106   CO2 29 26    80   BUN 8 6   CREATININE 0.8 0.7   CALCIUM 10.1 8.5*   PROT 7.4 5.6*  5.6*   ALBUMIN 4.5 3.4*  3.4*   BILITOT 0.7 1.6*  1.6*   ALKPHOS 53* 58  58   AST 41* 92*  92*   ALT 22 152*  152*   ANIONGAP 6* 6*     Magnesium:   Recent Labs   Lab 01/29/25 0524   MG 1.5*       Significant Imaging: I have reviewed all pertinent imaging results/findings within the past 24 hours.

## 2025-01-29 NOTE — ANESTHESIA PROCEDURE NOTES
Intubation    Date/Time: 1/29/2025 7:24 AM    Performed by: Anthony Zhang CRNA  Authorized by: Darien Morgan MD    Intubation:     Induction:  Intravenous    Intubated:  Postinduction    Mask Ventilation:  Easy mask    Attempts:  1    Attempted By:  CRNA    Method of Intubation:  Video laryngoscopy    Blade:  Huff 3    Laryngeal View Grade: Grade I - full view of cords      Difficult Airway Encountered?: No      Complications:  None    Airway Device:  Oral endotracheal tube    Airway Device Size:  7.0    Style/Cuff Inflation:  Cuffed (inflated to minimal occlusive pressure)    Tube secured:  21    Secured at:  The lips    Placement Verified By:  Capnometry    Complicating Factors:  None    Findings Post-Intubation:  BS equal bilateral and atraumatic/condition of teeth unchanged

## 2025-01-29 NOTE — OP NOTE
DATE OF PROCEDURE: 01/29/2025    PREOPERATIVE DIAGNOSIS: Acute cholecystitis    POSTOPERATIVE DIAGNOSIS: Same    PROCEDURE: Laparoscopic cholecystectomy    SURGEON: Mohan Perez M.D    ASSISTANT: None    ANESTHESIA: General endotracheal    ESTIMATED BLOOD LOSS: Minimal    SPECIMEN: Gallbladder    CONDITION: Stable    COMPLICATIONS: None    FINDINGS:   Gallbladder acutely inflamed  Critical view of safety achieved  Spillage of small amount of bile but no stones    INDICATIONS: The patient is a 34 y.o. female admitted with severe upper abdominal pain. Imaging showed dilated common bile duct an EUS was performed which failed to show obvious filling defect. Some concern for ongoing acute cholecystitis. The patient was counseled on their surgical options and desired surgical intervention. The risks of the procedure were described to the patient including pain, infection, bleeding, scarring, wound complications, injury to local structures such as bile duct, liver or intestine, warranting more extensive surgery, retained common bile duct stone or need for further intervention. The patient demonstrated understanding of these risks and a consent form was obtained.    PROCEDURE IN DETAIL: PROCEDURE IN DETAIL: The patient was identified in the Preoperative Unit and taken back to the Operating Room and laid supine on the operating room table. IV antibiotics were administered and general anesthesia was induced without complication. The patient was then prepped and draped in the standard sterile fashion. A timeout was performed in accordance with hospital protocol.  A Veress needle was introduced into the abdominal cavity and insufflation was attached. We had appropriate initial pressures and pneumoperitoneum was achieved. Local anesthetic was injected then a stab incision was sharply made just above the umbilicus. A 5 mm Optiview trocar was introduced into the peritoneal cavity under direct visualization.  We examined the  trocar and Veress needle insertion sites and no obvious injury was identified. An 11 mm trocar was then placed in subxiphoid region under direct visualization after injecting local anesthetic.  Two additional 5 mm trocars were placed in the right mid and right lateral abdomen under direct visualization again after injecting local anesthetic. The gallbladder was identified and found to acutely inflamed. This has significant amount of edema and omental adhesions.  Adhesions were taken down through a combination of blunt and sharp dissection. The gallbladder fundus was grasped and retracted into the right upper quadrant and the infundibulum retracted laterally. The peritoneum over the infundibulum and cystic structures was then gently stripped until the cystic duct and artery were identified and the critical view of safety was achieved.The cystic duct and artery were doubly clipped proximally and once distally and then divided. The gallbladder was then dissected off the gallbladder fossa using electrocautery. Once dissection was completed, the gallbladder was placed into an EndoCatch bag and removed through the subxiphoid port. There was some spillage of bile but no stones during the dissection of the gallbladder. The right upper quadrant was then irrigated and then suctioned. The dissection field was inspected for hemostasis, bile leak and to confirmed clips were in place. Additional local anesthetic was injected around the port sites under direct visualization.  The subxiphoid fascial incision was closed with a 0 Vicryl suture. The abdomen was desufflated and ports removed. Additional local anesthetic was injected and all the skin incisions were closed using a 4-0 Monocryl subcuticular stitch. Dermabond was then applied. The patient was awakened from general anesthesia without complication and returned to the Postoperative Recovery Unit in stable condition. At the end of the case, sponge, instrument and needle counts  were correct on 2 occasions. I was present and scrubbed throughout the entirety of the case.

## 2025-01-29 NOTE — ANESTHESIA PREPROCEDURE EVALUATION
01/29/2025  Muriel Vasquez is a 34 y.o., female.        Results for orders placed or performed during the hospital encounter of 01/27/25   EKG 12-lead    Collection Time: 01/28/25  1:20 AM   Result Value Ref Range    QRS Duration 72 ms    [facility] QTC Calculation 403 ms    Narrative    Test Reason : R10.9,    Vent. Rate :  52 BPM     Atrial Rate :  52 BPM     P-R Int : 138 ms          QRS Dur :  72 ms      QT Int : 434 ms       P-R-T Axes :  17  80  50 degrees    QTcB Int : 403 ms    Sinus bradycardia with Premature atrial complexes in a pattern of bigeminy  Otherwise normal ECG  No previous ECGs available    Referred By: AAAREFERRAL SELF           Confirmed By:         Imaging Results              MRI MRCP Abdomen WO Contrast 3D WO Independent WS XPD (Final result)  Result time 01/28/25 12:14:37      Final result by Mani Kim MD (01/28/25 12:14:37)                   Impression:      1. Gallstones.  2. Mild dilation of the common bile duct at up to 9 mm, unchanged compared to October 2024.  No evidence of choledocholithiasis.      Electronically signed by: Mani Kim  Date:    01/28/2025  Time:    12:14               Narrative:    EXAMINATION:  MRI MRCP ABDOMEN WO CONTRAST 3D WO INDEPENDENT WS XPD    CLINICAL HISTORY:  CBD dilation;    COMPARISON:  October 2024    FINDINGS:  Multiplanar noncontrast imaging was performed, utilizing magnetic resonance cholangiopancreatography protocol.    Several gallstones are noted, the largest measuring 17 mm in size.  No gallbladder wall thickening or pericholecystic edema is appreciated.    Intrahepatic biliary tree is nondilated.  Common bile duct is dilated at up to 9 mm.  There are no identifiable filling defects to indicate choledocholithiasis.  There is smoothly marginated tapered narrowing of the common bile duct approximately 2 cm above the MP along,  likely physiologic.  The pancreatic duct is normal in caliber.    There is no evidence of hepatic mass or contour abnormality.  The spleen is normal in size and appearance.  The pancreas and adrenal glands are normal.  Tiny bilateral renal cysts are noted.  There is no free fluid.                                        US Abdomen Limited (Final result)  Result time 01/28/25 02:07:04      Final result by Goyo Gallego MD (01/28/25 02:07:04)                   Impression:      Cholelithiasis with equivocal evaluation for acute cholecystitis.  The technologist reports a positive sonographic Farooq sign, although there are no additional sonographic features to suggest acute cholecystitis at this time.  If there is strong clinical concern for acute cholecystitis, HIDA scan could be performed for more sensitive evaluation.  Consider surgical consultation/follow-up if not previously performed in light of recurrent symptomatology.    Dilated common bile duct measuring up to 11-12 mm, slightly increased in extent from prior examination.  No definite filling defect appreciated within the visualized common bile duct to suggest choledocholithiasis.  This remains of uncertain etiology.  If there is clinical concern for biliary obstruction, ERCP or MRCP may be performed as warranted.    Mild hepatomegaly.    This report was flagged in Epic as abnormal.      Electronically signed by: Goyo Gallego MD  Date:    01/28/2025  Time:    02:07               Narrative:    EXAMINATION:  US ABDOMEN LIMITED    CLINICAL HISTORY:  Right upper quadrant pain, rule out cholecystitis;    TECHNIQUE:  Limited ultrasound of the right upper quadrant of the abdomen (including pancreas, liver, gallbladder, common bile duct, and spleen) was performed.    COMPARISON:  Ultrasound 10/21/2024, MRCP 10/21/2024    FINDINGS:  Liver: The liver is mildly enlarged and measures 18.6 cm.  No focal hepatic abnormality identified.    Gallbladder: There is  cholelithiasis present.  No significant gallbladder wall thickening or hyperemia appreciated.  There is no significant pericholecystic fluid.  The technologist reports a positive sonographic Farooq sign.    Biliary system: The common duct is dilated, measuring 11-12 mm, slightly increased from prior ultrasound examination (previously 9-10 mm).  No discrete echogenic filling defect appreciated in the visualized portions of the common bile duct.  No significant intrahepatic biliary ductal dilatation appreciated.    Pancreas: Visualized portion appears within normal limits.    Right kidney: No evidence of hydronephrosis.    Miscellaneous: No upper abdominal ascites.                                       Lab Results   Component Value Date    WBC 18.19 (H) 01/28/2025    HGB 13.0 01/28/2025    HCT 39.6 01/28/2025     (H) 01/28/2025     01/28/2025     BMP  Lab Results   Component Value Date     01/28/2025    K 4.2 01/28/2025     01/28/2025    CO2 29 01/28/2025    BUN 8 01/28/2025    CREATININE 0.8 01/28/2025    CALCIUM 10.1 01/28/2025    ANIONGAP 6 (L) 01/28/2025     01/28/2025    GLU 92 10/21/2024     07/27/2024        Latest Reference Range & Units 01/28/25 02:08   hCG Qualitative, Urine Negative  Negative   POCT URINE PREGNANCY  Rpt    Acceptable  Yes   Rpt: View report in Results Review for more information         Pre-op Assessment    I have reviewed the Patient Summary Reports.     I have reviewed the Nursing Notes. I have reviewed the NPO Status.   I have reviewed the Medications.     Review of Systems  Anesthesia Hx:             Denies Family Hx of Anesthesia complications.    Denies Personal Hx of Anesthesia complications.                    Social:  Smoker, No Alcohol Use, Recreational Drugs Drug use: Marijuana      Hematology/Oncology:  Hematology Normal   Oncology Normal                                   EENT/Dental:   No upper teeth           Cardiovascular:  Cardiovascular Normal                                              Pulmonary:  Pulmonary Normal                       Renal/:  Renal/ Normal                 Hepatic/GI:        Acute cholecystitis with right upper quadrant pain and nausea after eating fatty foods.               Musculoskeletal:  Musculoskeletal Normal                Neurological:  Neurology Normal                                      Endocrine:  Endocrine Normal            Psych:  Psychiatric Normal                  Physical Exam  General: Well nourished, Cooperative, Alert and Oriented    Airway:  Mallampati: II   Mouth Opening: Normal  TM Distance: > 6 cm  Tongue: Normal  Neck ROM: Normal ROM    Dental:  Periodontal disease  No upper teeth   Chest/Lungs:  Clear to auscultation, Normal Respiratory Rate    Heart:  Rate: Normal  Rhythm: Regular Rhythm  Sounds: Normal        Anesthesia Plan  Type of Anesthesia, risks & benefits discussed:    Anesthesia Type: Gen ETT  Intra-op Monitoring Plan: Standard ASA Monitors  Post Op Pain Control Plan: multimodal analgesia and IV/PO Opioids PRN  Induction:  IV  Informed Consent: Informed consent signed with the Patient and all parties understand the risks and agree with anesthesia plan.  All questions answered.   ASA Score: 2 Emergent  Anesthesia Plan Notes:       GETA  Benadryl 6.25mg iv, Decadron 8mg, iv Zofran 4mg iv, Pepcid 20mg iv, Scopolamine Patch   Ofirmev 1000mg iv  Sugammadex        Ready For Surgery From Anesthesia Perspective.     .

## 2025-01-30 ENCOUNTER — ANESTHESIA EVENT (OUTPATIENT)
Dept: SURGERY | Facility: HOSPITAL | Age: 35
DRG: 418 | End: 2025-01-30
Payer: MEDICAID

## 2025-01-30 ENCOUNTER — ANESTHESIA (OUTPATIENT)
Dept: SURGERY | Facility: HOSPITAL | Age: 35
DRG: 418 | End: 2025-01-30
Payer: MEDICAID

## 2025-01-30 LAB
ALBUMIN SERPL BCP-MCNC: 3.9 G/DL (ref 3.5–5.2)
ALP SERPL-CCNC: 82 U/L (ref 55–135)
ALT SERPL W/O P-5'-P-CCNC: 246 U/L (ref 10–44)
ANION GAP SERPL CALC-SCNC: 7 MMOL/L (ref 8–16)
AST SERPL-CCNC: 224 U/L (ref 10–40)
BASOPHILS # BLD AUTO: 0.03 K/UL (ref 0–0.2)
BASOPHILS NFR BLD: 0.2 % (ref 0–1.9)
BILIRUB SERPL-MCNC: 3 MG/DL (ref 0.1–1)
BUN SERPL-MCNC: 5 MG/DL (ref 6–20)
CALCIUM SERPL-MCNC: 9 MG/DL (ref 8.7–10.5)
CHLORIDE SERPL-SCNC: 103 MMOL/L (ref 95–110)
CO2 SERPL-SCNC: 29 MMOL/L (ref 23–29)
CREAT SERPL-MCNC: 0.8 MG/DL (ref 0.5–1.4)
DIFFERENTIAL METHOD BLD: ABNORMAL
EOSINOPHIL # BLD AUTO: 0 K/UL (ref 0–0.5)
EOSINOPHIL NFR BLD: 0 % (ref 0–8)
ERYTHROCYTE [DISTWIDTH] IN BLOOD BY AUTOMATED COUNT: 13.3 % (ref 11.5–14.5)
EST. GFR  (NO RACE VARIABLE): >60 ML/MIN/1.73 M^2
GLUCOSE SERPL-MCNC: 115 MG/DL (ref 70–110)
HCT VFR BLD AUTO: 37.2 % (ref 37–48.5)
HGB BLD-MCNC: 12.3 G/DL (ref 12–16)
IMM GRANULOCYTES # BLD AUTO: 0.08 K/UL (ref 0–0.04)
IMM GRANULOCYTES NFR BLD AUTO: 0.5 % (ref 0–0.5)
LIPASE SERPL-CCNC: 25 U/L (ref 4–60)
LYMPHOCYTES # BLD AUTO: 2.4 K/UL (ref 1–4.8)
LYMPHOCYTES NFR BLD: 15.5 % (ref 18–48)
MAGNESIUM SERPL-MCNC: 1.7 MG/DL (ref 1.6–2.6)
MCH RBC QN AUTO: 32.7 PG (ref 27–31)
MCHC RBC AUTO-ENTMCNC: 33.1 G/DL (ref 32–36)
MCV RBC AUTO: 99 FL (ref 82–98)
MONOCYTES # BLD AUTO: 1.1 K/UL (ref 0.3–1)
MONOCYTES NFR BLD: 6.7 % (ref 4–15)
NEUTROPHILS # BLD AUTO: 12.1 K/UL (ref 1.8–7.7)
NEUTROPHILS NFR BLD: 77.1 % (ref 38–73)
NRBC BLD-RTO: 0 /100 WBC
PLATELET # BLD AUTO: 320 K/UL (ref 150–450)
PMV BLD AUTO: 11.2 FL (ref 9.2–12.9)
POTASSIUM SERPL-SCNC: 4 MMOL/L (ref 3.5–5.1)
PROT SERPL-MCNC: 6.5 G/DL (ref 6–8.4)
RBC # BLD AUTO: 3.76 M/UL (ref 4–5.4)
SODIUM SERPL-SCNC: 139 MMOL/L (ref 136–145)
WBC # BLD AUTO: 15.7 K/UL (ref 3.9–12.7)

## 2025-01-30 PROCEDURE — 25000003 PHARM REV CODE 250: Performed by: NURSE ANESTHETIST, CERTIFIED REGISTERED

## 2025-01-30 PROCEDURE — 63600175 PHARM REV CODE 636 W HCPCS: Performed by: NURSE ANESTHETIST, CERTIFIED REGISTERED

## 2025-01-30 PROCEDURE — 63600175 PHARM REV CODE 636 W HCPCS: Performed by: SURGERY

## 2025-01-30 PROCEDURE — 63600175 PHARM REV CODE 636 W HCPCS: Performed by: NURSE PRACTITIONER

## 2025-01-30 PROCEDURE — C1769 GUIDE WIRE: HCPCS | Performed by: INTERNAL MEDICINE

## 2025-01-30 PROCEDURE — 43262 ENDO CHOLANGIOPANCREATOGRAPH: CPT | Performed by: INTERNAL MEDICINE

## 2025-01-30 PROCEDURE — 83690 ASSAY OF LIPASE: CPT

## 2025-01-30 PROCEDURE — 27202125 HC BALLOON, EXTRACTION (ANY): Performed by: INTERNAL MEDICINE

## 2025-01-30 PROCEDURE — 0F798ZZ DILATION OF COMMON BILE DUCT, VIA NATURAL OR ARTIFICIAL OPENING ENDOSCOPIC: ICD-10-PCS | Performed by: SURGERY

## 2025-01-30 PROCEDURE — 25000003 PHARM REV CODE 250: Performed by: INTERNAL MEDICINE

## 2025-01-30 PROCEDURE — 43264 ERCP REMOVE DUCT CALCULI: CPT | Performed by: INTERNAL MEDICINE

## 2025-01-30 PROCEDURE — 25500020 PHARM REV CODE 255: Performed by: INTERNAL MEDICINE

## 2025-01-30 PROCEDURE — 80053 COMPREHEN METABOLIC PANEL: CPT | Performed by: INTERNAL MEDICINE

## 2025-01-30 PROCEDURE — 25000003 PHARM REV CODE 250: Performed by: SURGERY

## 2025-01-30 PROCEDURE — 63600175 PHARM REV CODE 636 W HCPCS: Mod: JZ | Performed by: STUDENT IN AN ORGANIZED HEALTH CARE EDUCATION/TRAINING PROGRAM

## 2025-01-30 PROCEDURE — 21400001 HC TELEMETRY ROOM

## 2025-01-30 PROCEDURE — 36415 COLL VENOUS BLD VENIPUNCTURE: CPT | Performed by: INTERNAL MEDICINE

## 2025-01-30 PROCEDURE — 74328 X-RAY BILE DUCT ENDOSCOPY: CPT | Mod: TC | Performed by: INTERNAL MEDICINE

## 2025-01-30 PROCEDURE — 83735 ASSAY OF MAGNESIUM: CPT | Performed by: INTERNAL MEDICINE

## 2025-01-30 PROCEDURE — 37000009 HC ANESTHESIA EA ADD 15 MINS: Performed by: INTERNAL MEDICINE

## 2025-01-30 PROCEDURE — 94761 N-INVAS EAR/PLS OXIMETRY MLT: CPT

## 2025-01-30 PROCEDURE — 63600175 PHARM REV CODE 636 W HCPCS: Performed by: INTERNAL MEDICINE

## 2025-01-30 PROCEDURE — 37000008 HC ANESTHESIA 1ST 15 MINUTES: Performed by: INTERNAL MEDICINE

## 2025-01-30 PROCEDURE — 99223 1ST HOSP IP/OBS HIGH 75: CPT | Mod: ,,, | Performed by: STUDENT IN AN ORGANIZED HEALTH CARE EDUCATION/TRAINING PROGRAM

## 2025-01-30 PROCEDURE — 85025 COMPLETE CBC W/AUTO DIFF WBC: CPT | Performed by: INTERNAL MEDICINE

## 2025-01-30 RX ORDER — ONDANSETRON HYDROCHLORIDE 2 MG/ML
INJECTION, SOLUTION INTRAMUSCULAR; INTRAVENOUS
Status: DISCONTINUED | OUTPATIENT
Start: 2025-01-30 | End: 2025-01-30

## 2025-01-30 RX ORDER — FAMOTIDINE 10 MG/ML
INJECTION INTRAVENOUS
Status: DISCONTINUED | OUTPATIENT
Start: 2025-01-30 | End: 2025-01-30

## 2025-01-30 RX ORDER — SUCCINYLCHOLINE CHLORIDE 20 MG/ML
INJECTION INTRAMUSCULAR; INTRAVENOUS
Status: DISCONTINUED | OUTPATIENT
Start: 2025-01-30 | End: 2025-01-30

## 2025-01-30 RX ORDER — INDOMETHACIN 50 MG/1
SUPPOSITORY RECTAL
Status: COMPLETED | OUTPATIENT
Start: 2025-01-30 | End: 2025-01-30

## 2025-01-30 RX ORDER — LIDOCAINE HYDROCHLORIDE 20 MG/ML
INJECTION, SOLUTION EPIDURAL; INFILTRATION; INTRACAUDAL; PERINEURAL
Status: DISCONTINUED | OUTPATIENT
Start: 2025-01-30 | End: 2025-01-30

## 2025-01-30 RX ORDER — ONDANSETRON HYDROCHLORIDE 2 MG/ML
4 INJECTION, SOLUTION INTRAVENOUS DAILY PRN
Status: DISCONTINUED | OUTPATIENT
Start: 2025-01-30 | End: 2025-01-30 | Stop reason: HOSPADM

## 2025-01-30 RX ORDER — ROCURONIUM BROMIDE 10 MG/ML
INJECTION, SOLUTION INTRAVENOUS
Status: DISCONTINUED | OUTPATIENT
Start: 2025-01-30 | End: 2025-01-30

## 2025-01-30 RX ORDER — HYDROMORPHONE HYDROCHLORIDE 1 MG/ML
0.2 INJECTION, SOLUTION INTRAMUSCULAR; INTRAVENOUS; SUBCUTANEOUS EVERY 5 MIN PRN
Status: DISCONTINUED | OUTPATIENT
Start: 2025-01-30 | End: 2025-01-30 | Stop reason: HOSPADM

## 2025-01-30 RX ORDER — PROPOFOL 10 MG/ML
VIAL (ML) INTRAVENOUS
Status: DISCONTINUED | OUTPATIENT
Start: 2025-01-30 | End: 2025-01-30

## 2025-01-30 RX ORDER — OXYCODONE HYDROCHLORIDE 5 MG/1
5 TABLET ORAL
Status: DISCONTINUED | OUTPATIENT
Start: 2025-01-30 | End: 2025-01-30 | Stop reason: HOSPADM

## 2025-01-30 RX ORDER — GLUCAGON 1 MG
1 KIT INJECTION
Status: DISCONTINUED | OUTPATIENT
Start: 2025-01-30 | End: 2025-01-30 | Stop reason: HOSPADM

## 2025-01-30 RX ORDER — DIPHENHYDRAMINE HYDROCHLORIDE 50 MG/ML
12.5 INJECTION INTRAMUSCULAR; INTRAVENOUS
Status: DISCONTINUED | OUTPATIENT
Start: 2025-01-30 | End: 2025-01-30 | Stop reason: HOSPADM

## 2025-01-30 RX ORDER — KETOROLAC TROMETHAMINE 30 MG/ML
15 INJECTION, SOLUTION INTRAMUSCULAR; INTRAVENOUS ONCE
Status: COMPLETED | OUTPATIENT
Start: 2025-01-30 | End: 2025-01-30

## 2025-01-30 RX ADMIN — KETOROLAC TROMETHAMINE 15 MG: 30 INJECTION, SOLUTION INTRAMUSCULAR; INTRAVENOUS at 06:01

## 2025-01-30 RX ADMIN — PIPERACILLIN SODIUM AND TAZOBACTAM SODIUM 3.38 G: 3; .375 INJECTION, POWDER, FOR SOLUTION INTRAVENOUS at 05:01

## 2025-01-30 RX ADMIN — HYDROCODONE BITARTRATE AND ACETAMINOPHEN 1 TABLET: 5; 325 TABLET ORAL at 03:01

## 2025-01-30 RX ADMIN — SODIUM CHLORIDE, SODIUM LACTATE, POTASSIUM CHLORIDE, AND CALCIUM CHLORIDE: .6; .31; .03; .02 INJECTION, SOLUTION INTRAVENOUS at 02:01

## 2025-01-30 RX ADMIN — LIDOCAINE HYDROCHLORIDE 50 MG: 20 INJECTION, SOLUTION INTRAVENOUS at 02:01

## 2025-01-30 RX ADMIN — ONDANSETRON 4 MG: 2 INJECTION INTRAMUSCULAR; INTRAVENOUS at 05:01

## 2025-01-30 RX ADMIN — PANTOPRAZOLE SODIUM 40 MG: 40 INJECTION, POWDER, FOR SOLUTION INTRAVENOUS at 09:01

## 2025-01-30 RX ADMIN — PROPOFOL 200 MG: 10 INJECTION, EMULSION INTRAVENOUS at 02:01

## 2025-01-30 RX ADMIN — Medication 120 MG: at 02:01

## 2025-01-30 RX ADMIN — FAMOTIDINE 20 MG: 10 INJECTION, SOLUTION INTRAVENOUS at 02:01

## 2025-01-30 RX ADMIN — ONDANSETRON 4 MG: 2 INJECTION INTRAMUSCULAR; INTRAVENOUS at 02:01

## 2025-01-30 RX ADMIN — ROCURONIUM BROMIDE 5 MG: 10 INJECTION, SOLUTION INTRAVENOUS at 02:01

## 2025-01-30 RX ADMIN — PIPERACILLIN SODIUM AND TAZOBACTAM SODIUM 3.38 G: 3; .375 INJECTION, POWDER, FOR SOLUTION INTRAVENOUS at 12:01

## 2025-01-30 RX ADMIN — PIPERACILLIN SODIUM AND TAZOBACTAM SODIUM 3.38 G: 3; .375 INJECTION, POWDER, FOR SOLUTION INTRAVENOUS at 09:01

## 2025-01-30 NOTE — [employer] PATIENT INSTRUCTIONS
Discharge Summary/Instructions after an Endoscopic Procedure  Patient Name: Muriel Vasquez  Patient MRN: [id number]  Patient YOB: 1990 Thursday, January 30, 2025  Anil Garcia III, MD  RESTRICTIONS:  During your procedure today, you received medications for sedation.  These   medications may affect your judgment, balance and coordination.  Therefore,   for 24 hours, you have the following restrictions:   - DO NOT drive a car, operate machinery, make legal/financial decisions,   sign important papers or drink alcohol.    ACTIVITY:  Today: no heavy lifting, straining or running due to procedural   sedation/anesthesia.  The following day: return to full activity including work.  DIET:  Eat and drink normally unless instructed otherwise.     TREATMENT FOR COMMON SIDE EFFECTS:  - Mild abdominal pain, nausea, belching, bloating or excessive gas:  rest,   eat lightly and use a heating pad.  - Sore Throat: treat with throat lozenges and/or gargle with warm salt   water.  - Because air was used during the procedure, expelling large amounts of air   from your rectum or belching is normal.  - If a bowel prep was taken, you may not have a bowel movement for 1-3 days.    This is normal.  SYMPTOMS TO WATCH FOR AND REPORT TO YOUR PHYSICIAN:  1. Abdominal pain or bloating, other than gas cramps.  2. Chest pain.  3. Back pain.  4. Signs of infection such as: chills or fever occurring within 24 hours   after the procedure.  5. Rectal bleeding, which would show as bright red, maroon, or black stools.   (A tablespoon of blood from the rectum is not serious, especially if   hemorrhoids are present.)  6. Vomiting.  7. Weakness or dizziness.  GO DIRECTLY TO THE NEAREST EMERGENCY ROOM IF YOU HAVE ANY OF THE FOLLOWING:      Difficulty breathing              Chills and/or fever over 101 F   Persistent vomiting and/or vomiting blood   Severe abdominal pain   Severe chest pain   Black, tarry stools   Bleeding- more than one  tablespoon   Any other symptom or condition that you feel may need urgent attention  Your doctor recommends these additional instructions:  If any biopsies were taken, your doctors clinic will contact you in 1 to 2   weeks with any results.  - Continue present medications.   - Patient has a contact number available for emergencies.  The signs and   symptoms of potential delayed complications were discussed with the   patient.  Return to normal activities tomorrow.  Written discharge   instructions were provided to the patient.   - Personally d/w'd Timmy (significant other) and surgery/IM. Check CMP in AM   and ok to dc home barbara morning as long as no issues.   - Return patient to hospital [facility] for ongoing care.  For questions, problems or results please call your physician - Anil Garcia III, MD at Work:  [phone].  [facility], EMERGENCY ROOM PHONE NUMBER: [phone]  IF A COMPLICATION OR EMERGENCY SITUATION ARISES AND YOU ARE UNABLE TO REACH   YOUR PHYSICIAN - GO DIRECTLY TO THE EMERGENCY ROOM.  Anil Garcia III, MD  1/30/2025 2:58:10 PM  This report has been verified and signed electronically.  Dear patient,  As a result of recent federal legislation (The Federal Cures Act), you may   receive lab or pathology results from your procedure in your [facility]   account before your physician is able to contact you. Your physician or   their representative will relay the results to you with their   recommendations at their soonest availability.  Thank you,  [employer]

## 2025-01-30 NOTE — ANESTHESIA POSTPROCEDURE EVALUATION
Anesthesia Post Evaluation    Patient: Muriel Vasquez    Procedure(s) Performed: Procedure(s) (LRB):  ERCP (ENDOSCOPIC RETROGRADE CHOLANGIOPANCREATOGRAPHY) (N/A)    Final Anesthesia Type: general      Patient location during evaluation: PACU  Patient participation: Yes- Able to Participate  Level of consciousness: awake and alert  Post-procedure vital signs: reviewed and stable  Pain management: adequate  Airway patency: patent    PONV status at discharge: No PONV  Anesthetic complications: no      Cardiovascular status: stable  Respiratory status: unassisted and spontaneous ventilation  Hydration status: euvolemic  Follow-up not needed.              Vitals Value Taken Time   /62 01/30/25 1510   Temp 36.2 °C (97.2 °F) 01/30/25 1455   Pulse 57 01/30/25 1514   Resp 20 01/30/25 1514   SpO2 99 % 01/30/25 1514   Vitals shown include unfiled device data.      No case tracking events are documented in the log.      Pain/Ashley Score: Pain Rating Prior to Med Admin: 8 (1/30/2025  6:39 AM)  Pain Rating Post Med Admin: 1 (1/30/2025  4:51 AM)  Ashley Score: 10 (1/30/2025  3:10 PM)

## 2025-01-30 NOTE — HOSPITAL COURSE
34 year old female was admitted to the hospital with acute cholecystitis.  She was started on IV zosyn and general surgery was consulted.  Dr. Perez performed cholecystectomy 1/29.  Patient developed leukocytosis, elevated LFTs and T. Bili.  MRCP was obtained which demonstrated gallstones and mild dilation of CBD.  GI was consulted.  ERCP was done 1/30 She was noted to be bradycardic with bigeminy on EKG and Cardiology was consulted.  Patient was evaluated by Cardiology who recommended continue monitoring on telemetry and signed off. Patient underwent ERCP with GI 1/30 with biliary sweep.  She tolerated procedure well, tolerated a regular diet.  Stable for d/c with close follow up with PCP and general surgery.  Plan of care discussed with patient and she verbalized understanding.  Patient was seen and examined on the day of discharge.

## 2025-01-30 NOTE — CONSULTS
"[facility]  Department of Cardiology  Consult Note      PATIENT NAME: Muriel Vasquez  MRN: [id number]  TODAY'S DATE: 01/30/2025  ADMIT DATE: 1/27/2025                          CONSULT REQUESTED BY: Sheryl Guardado DO    SUBJECTIVE     PRINCIPAL PROBLEM: Cholelithiasis with acute cholecystitis      REASON FOR CONSULT:  Bradycardic, here with cholecystitis plan for ERCP today, bili and LFTs trending up      HPI:  Ms. Vasquez is a 34 yr old female with pmh of known gallbladder issues who came into the ER with complaints of right upper quadrant abdominal pain after eating fatty foods associated with nausea.  She was then admitted for acute cholecystitis and underwent laparoscopic cholecystectomy earlier today.  Cardiology was consulted for bradycardia.  When speaking with patient, she states she has known bradycardia and has been told that her HR runs lower.  She is asymptomatic, denies any dizziness, denies passing out or feeling more tired.  Telemetry reviewed, shows sinus Eduardo with a low of 34.     Per hospital medicine notes:  Otherwise healthy 34-year-old  female who has had 3 visits to the ER over the last several months for gallbladder issues  Patient has right upper quadrant pain precipitated by eating fatty foods.  It starts out mild and gets progressively worse; she also has nausea with it  In the ER she was afebrile and hemodynamically stable  WBC was 18.19 with a normal T bill but AST was 41; she is not pregnant  Does have evidence of a UTI with 13 WBC, moderate bacteria, and trace leukocytes  Abdominal ultrasound showed per radiologist Dr. Goyo Gallego:     "Impression:     Cholelithiasis with equivocal evaluation for acute cholecystitis.  The technologist reports a positive sonographic Farooq sign, although there are no additional sonographic features to suggest acute cholecystitis at this time.  If there is strong clinical concern for acute cholecystitis, HIDA scan could be performed " "for more sensitive evaluation.  Consider surgical consultation/follow-up if not previously performed in light of recurrent symptomatology.     Dilated common bile duct measuring up to 11-12 mm, slightly increased in extent from prior examination.  No definite filling defect appreciated within the visualized common bile duct to suggest choledocholithiasis.  This remains of uncertain etiology.  If there is clinical concern for biliary obstruction, ERCP or MRCP may be performed as warranted.     Mild hepatomegaly.     This report was flagged in Epic as abnormal."     She received a dose of Dilaudid, a L of fluid, and was started on a regimen of Zosyn; pain is 0/10->MRCP is pending     Overview/Hospital Course:  No notes on file     Interval History:  Patient was seen and examined.  Overnight notes reviewed.  Patient examined postoperatively.  She reports right-sided abdominal pain.  Noted to have elevated bilirubin and LFTs on a.m. labs.  GI consulted with plan for ERCP tomorrow.  Continue Zosyn.  NPO at midnight.  Magnesium noted to be low on a.m. labs and replacements ordered.  General surgery following.        Review of patient's allergies indicates:  No Known Allergies    Past Medical History:   Diagnosis Date    Hidradenitis      Past Surgical History:   Procedure Laterality Date    ENDOSCOPIC ULTRASOUND OF UPPER GASTROINTESTINAL TRACT N/A 1/28/2025    Procedure: ULTRASOUND, UPPER GI TRACT, ENDOSCOPIC;  Surgeon: Anil Garcia III, MD;  Location: [facility] ENDO;  Service: Endoscopy;  Laterality: N/A;    LAPAROSCOPIC CHOLECYSTECTOMY N/A 1/29/2025    Procedure: CHOLECYSTECTOMY, LAPAROSCOPIC;  Surgeon: Mohan Perez Jr., MD;  Location: [facility] OR;  Service: General;  Laterality: N/A;     Social History     Tobacco Use    Smoking status: Every Day     Types: Cigarettes, Vaping with nicotine    Smokeless tobacco: Never    Tobacco comments:     About 5 cigs a day   Substance Use Topics    Alcohol use: Not " Currently    Drug use: Yes     Types: Marijuana        REVIEW OF SYSTEMS    As mentioned in HPI    OBJECTIVE     VITAL SIGNS (Most Recent)  Temp: 98.4 °F (36.9 °C) (01/30/25 0346)  Pulse: (!) 58 (01/30/25 0912)  Resp: 17 (01/30/25 0912)  BP: 121/82 (01/30/25 0346)  SpO2: 97 % (01/30/25 0912)    VENTILATION STATUS  Resp: 17 (01/30/25 0912)  SpO2: 97 % (01/30/25 0912)           I & O (Last 24H):  Intake/Output Summary (Last 24 hours) at 1/30/2025 1341  Last data filed at 1/30/2025 1202  Gross per 24 hour   Intake 2574.98 ml   Output --   Net 2574.98 ml       WEIGHTS  Wt Readings from Last 3 Encounters:   01/28/25 2030 98.9 kg (218 lb 0.6 oz)   01/27/25 2257 99.8 kg (220 lb)   01/29/25 1315 98.9 kg (218 lb)   10/21/24 1647 104.3 kg (230 lb)       PHYSICAL EXAM    CONSTITUTIONAL: NAD  HEENT: Normocephalic. No pallor  NECK: no JVD  LUNGS: CTA b/l  HEART:  Sinus Eduardo, S1, S2 normal, no murmur   ABDOMEN: soft, + tenderness right upper quadrant, bowel sounds normal  EXTREMITIES: No edema  SKIN: No rash  NEURO: AAO X 3  PSYCH: normal affect      HOME MEDICATIONS:  No current facility-administered medications on file prior to encounter.     Current Outpatient Medications on File Prior to Encounter   Medication Sig Dispense Refill    acetaminophen (TYLENOL) 325 MG tablet Take 325 mg by mouth every 6 (six) hours as needed for Pain.         SCHEDULED MEDS:   pantoprazole  40 mg Intravenous BID    piperacillin-tazobactam (Zosyn) IV (PEDS and ADULTS) (extended infusion is not appropriate)  3.375 g Intravenous Q8H    scopolamine  1 patch Transdermal Once       CONTINUOUS INFUSIONS:   0.9% NaCl   Intravenous Continuous   Stopped at 01/29/25 0618       PRN MEDS:  Current Facility-Administered Medications:     acetaminophen, 650 mg, Oral, Q8H PRN    acetaminophen, 650 mg, Oral, Q4H PRN    aluminum-magnesium hydroxide-simethicone, 30 mL, Oral, QID PRN    dextrose 50%, 12.5 g, Intravenous, PRN    dextrose 50%, 12.5 g, Intravenous,  "PRN    dextrose 50%, 25 g, Intravenous, PRN    diphenhydrAMINE, 12.5 mg, Intravenous, Q15 Min PRN    glucagon (human recombinant), 1 mg, Intramuscular, PRN    glucagon (human recombinant), 1 mg, Intramuscular, PRN    glucose, 16 g, Oral, PRN    glucose, 24 g, Oral, PRN    HYDROcodone-acetaminophen, 1 tablet, Oral, Q6H PRN    HYDROmorphone, 0.2 mg, Intravenous, Q5 Min PRN    magnesium oxide, 800 mg, Oral, PRN    magnesium oxide, 800 mg, Oral, PRN    melatonin, 6 mg, Oral, Nightly PRN    morphine, 2 mg, Intravenous, Q2H PRN    naloxone, 0.02 mg, Intravenous, PRN    ondansetron, 4 mg, Intravenous, Q6H PRN    ondansetron, 4 mg, Intravenous, Daily PRN    oxyCODONE, 5 mg, Oral, Q3H PRN    potassium bicarbonate, 35 mEq, Oral, PRN    potassium bicarbonate, 50 mEq, Oral, PRN    potassium bicarbonate, 60 mEq, Oral, PRN    potassium, sodium phosphates, 2 packet, Oral, PRN    potassium, sodium phosphates, 2 packet, Oral, PRN    potassium, sodium phosphates, 2 packet, Oral, PRN    LABS AND DIAGNOSTICS     CBC LAST 3 DAYS  Recent Labs   Lab 01/28/25  0039 01/29/25  0524 01/30/25  0504   WBC 18.19* 9.08 15.70*   RBC 3.97* 3.33* 3.76*   HGB 13.0 11.0* 12.3   HCT 39.6 33.4* 37.2   * 100* 99*   MCH 32.7* 33.0* 32.7*   MCHC 32.8 32.9 33.1   RDW 13.2 13.2 13.3    244 320   MPV 11.0 11.4 11.2   GRAN 82.7*  15.0* 56.5  5.1 77.1*  12.1*   LYMPH 10.9*  2.0 34.7  3.2 15.5*  2.4   MONO 5.4  1.0 5.7  0.5 6.7  1.1*   BASO 0.06 0.05 0.03   NRBC 0 0 0       COAGULATION LAST 3 DAYS  No results for input(s): "LABPT", "INR", "APTT" in the last 168 hours.    CHEMISTRY LAST 3 DAYS  Recent Labs   Lab 01/28/25  0039 01/29/25  0524 01/30/25  0504    138 139   K 4.2 3.6 4.0    106 103   CO2 29 26 29   ANIONGAP 6* 6* 7*   BUN 8 6 5*   CREATININE 0.8 0.7 0.8    80 115*   CALCIUM 10.1 8.5* 9.0   MG  --  1.5* 1.7   ALBUMIN 4.5 3.4*  3.4* 3.9   PROT 7.4 5.6*  5.6* 6.5   ALKPHOS 53* 58  58 82   ALT 22 152*  152* " "246*   AST 41* 92*  92* 224*   BILITOT 0.7 1.6*  1.6* 3.0*       CARDIAC PROFILE LAST 3 DAYS  No results for input(s): "BNP", "CPK", "CPKMB", "LDH", "TROPONINI", "TROPONINIHS" in the last 168 hours.    ENDOCRINE LAST 3 DAYS  No results for input(s): "TSH", "PROCAL" in the last 168 hours.    LAST ARTERIAL BLOOD GAS  ABG  No results for input(s): "PH", "PO2", "PCO2", "HCO3", "BE" in the last 168 hours.    LAST 7 DAYS MICROBIOLOGY   Microbiology Results (last 7 days)       Procedure Component Value Units Date/Time    Urine culture [[id number]] Collected: 01/29/25 1837    Order Status: Completed Specimen: Urine Updated: 01/30/25 0839     Urine Culture, Routine No growth to date    Narrative:      Specimen Source->Urine    Urine culture [[id number]] Collected: 01/28/25 0217    Order Status: Completed Specimen: Urine Updated: 01/29/25 0721     Urine Culture, Routine Multiple organisms isolated. None in predominance.  Repeat if      clinically necessary.    Narrative:      In and Out Cath as needed it patient unable to void  Specimen Source->Urine            MOST RECENT IMAGING  Echo    Left Ventricle: The left ventricle is normal in size. Normal wall   thickness. There is normal systolic function with a visually estimated   ejection fraction of 60 - 65%. There is normal diastolic function.    Right Ventricle: Normal right ventricular cavity size. Systolic   function is normal.    Tricuspid Valve: There is mild regurgitation.    IVC/SVC: Normal venous pressure at 3 mmHg.      ECHOCARDIOGRAM RESULTS (last 5)  Results for orders placed during the hospital encounter of 01/27/25    Echo    Interpretation Summary    Left Ventricle: The left ventricle is normal in size. Normal wall thickness. There is normal systolic function with a visually estimated ejection fraction of 60 - 65%. There is normal diastolic function.    Right Ventricle: Normal right ventricular cavity size. Systolic function is normal.    Tricuspid Valve: " There is mild regurgitation.    IVC/SVC: Normal venous pressure at 3 mmHg.      CURRENT/PREVIOUS VISIT EKG  Results for orders placed or performed during the hospital encounter of 01/27/25   EKG 12-lead    Collection Time: 01/28/25  1:20 AM   Result Value Ref Range    QRS Duration 72 ms    [facility] QTC Calculation 403 ms    Narrative    Test Reason : R10.9,    Vent. Rate :  52 BPM     Atrial Rate :  52 BPM     P-R Int : 138 ms          QRS Dur :  72 ms      QT Int : 434 ms       P-R-T Axes :  17  80  50 degrees    QTcB Int : 403 ms    Sinus bradycardia with Premature atrial complexes in a pattern of bigeminy  Otherwise normal ECG  No previous ECGs available    Referred By: AAAREFERRAL SELF           Confirmed By:            ASSESSMENT/PLAN:     Active Hospital Problems    Diagnosis    *Cholelithiasis with acute cholecystitis       ASSESSMENT & PLAN:     Cholelithiasis with acute cholecystis   Sinus bradycardia      RECOMMENDATIONS:    Continuous telemetry reviewed - she has sinus bradycardia, no pauses noted. Pt is asymptomatic - denies any worsening fatigue, denies any falls or passing out. She has known she has had bradycardia for years now.  Replace Mag.  Rest per primary team.   No further recommendations from Cardiology at this time.  We will sign off.     Jessica Zepeda  Department of Cardiology  Date of Service: 01/30/2025

## 2025-01-30 NOTE — ANESTHESIA PREPROCEDURE EVALUATION
01/30/2025  Muriel Vasquez is a 34 y.o., female.        Results for orders placed or performed during the hospital encounter of 01/27/25   EKG 12-lead    Collection Time: 01/28/25  1:20 AM   Result Value Ref Range    QRS Duration 72 ms    [facility] QTC Calculation 403 ms    Narrative    Test Reason : R10.9,    Vent. Rate :  52 BPM     Atrial Rate :  52 BPM     P-R Int : 138 ms          QRS Dur :  72 ms      QT Int : 434 ms       P-R-T Axes :  17  80  50 degrees    QTcB Int : 403 ms    Sinus bradycardia with Premature atrial complexes in a pattern of bigeminy  Otherwise normal ECG  No previous ECGs available    Referred By: AAAREFERRAL SELF           Confirmed By:         Imaging Results              MRI MRCP Abdomen WO Contrast 3D WO Independent WS XPD (Final result)  Result time 01/28/25 12:14:37      Final result by Mani Kim MD (01/28/25 12:14:37)                   Impression:      1. Gallstones.  2. Mild dilation of the common bile duct at up to 9 mm, unchanged compared to October 2024.  No evidence of choledocholithiasis.      Electronically signed by: Mani Kim  Date:    01/28/2025  Time:    12:14               Narrative:    EXAMINATION:  MRI MRCP ABDOMEN WO CONTRAST 3D WO INDEPENDENT WS XPD    CLINICAL HISTORY:  CBD dilation;    COMPARISON:  October 2024    FINDINGS:  Multiplanar noncontrast imaging was performed, utilizing magnetic resonance cholangiopancreatography protocol.    Several gallstones are noted, the largest measuring 17 mm in size.  No gallbladder wall thickening or pericholecystic edema is appreciated.    Intrahepatic biliary tree is nondilated.  Common bile duct is dilated at up to 9 mm.  There are no identifiable filling defects to indicate choledocholithiasis.  There is smoothly marginated tapered narrowing of the common bile duct approximately 2 cm above the MP along,  likely physiologic.  The pancreatic duct is normal in caliber.    There is no evidence of hepatic mass or contour abnormality.  The spleen is normal in size and appearance.  The pancreas and adrenal glands are normal.  Tiny bilateral renal cysts are noted.  There is no free fluid.                                        US Abdomen Limited (Final result)  Result time 01/28/25 02:07:04      Final result by Goyo Gallego MD (01/28/25 02:07:04)                   Impression:      Cholelithiasis with equivocal evaluation for acute cholecystitis.  The technologist reports a positive sonographic Farooq sign, although there are no additional sonographic features to suggest acute cholecystitis at this time.  If there is strong clinical concern for acute cholecystitis, HIDA scan could be performed for more sensitive evaluation.  Consider surgical consultation/follow-up if not previously performed in light of recurrent symptomatology.    Dilated common bile duct measuring up to 11-12 mm, slightly increased in extent from prior examination.  No definite filling defect appreciated within the visualized common bile duct to suggest choledocholithiasis.  This remains of uncertain etiology.  If there is clinical concern for biliary obstruction, ERCP or MRCP may be performed as warranted.    Mild hepatomegaly.    This report was flagged in Epic as abnormal.      Electronically signed by: Goyo Gallego MD  Date:    01/28/2025  Time:    02:07               Narrative:    EXAMINATION:  US ABDOMEN LIMITED    CLINICAL HISTORY:  Right upper quadrant pain, rule out cholecystitis;    TECHNIQUE:  Limited ultrasound of the right upper quadrant of the abdomen (including pancreas, liver, gallbladder, common bile duct, and spleen) was performed.    COMPARISON:  Ultrasound 10/21/2024, MRCP 10/21/2024    FINDINGS:  Liver: The liver is mildly enlarged and measures 18.6 cm.  No focal hepatic abnormality identified.    Gallbladder: There is  cholelithiasis present.  No significant gallbladder wall thickening or hyperemia appreciated.  There is no significant pericholecystic fluid.  The technologist reports a positive sonographic Farooq sign.    Biliary system: The common duct is dilated, measuring 11-12 mm, slightly increased from prior ultrasound examination (previously 9-10 mm).  No discrete echogenic filling defect appreciated in the visualized portions of the common bile duct.  No significant intrahepatic biliary ductal dilatation appreciated.    Pancreas: Visualized portion appears within normal limits.    Right kidney: No evidence of hydronephrosis.    Miscellaneous: No upper abdominal ascites.                                       Lab Results   Component Value Date    WBC 15.70 (H) 01/30/2025    HGB 12.3 01/30/2025    HCT 37.2 01/30/2025    MCV 99 (H) 01/30/2025     01/30/2025     BMP  Lab Results   Component Value Date     01/30/2025    K 4.0 01/30/2025     01/30/2025    CO2 29 01/30/2025    BUN 5 (L) 01/30/2025    CREATININE 0.8 01/30/2025    CALCIUM 9.0 01/30/2025    ANIONGAP 7 (L) 01/30/2025     (H) 01/30/2025    GLU 80 01/29/2025     01/28/2025        Latest Reference Range & Units 01/28/25 02:08   hCG Qualitative, Urine Negative  Negative   POCT URINE PREGNANCY  Rpt    Acceptable  Yes   Rpt: View report in Results Review for more information         Pre-op Assessment    I have reviewed the Patient Summary Reports.     I have reviewed the Nursing Notes. I have reviewed the NPO Status.   I have reviewed the Medications.     Review of Systems  Anesthesia Hx:             Denies Family Hx of Anesthesia complications.    Denies Personal Hx of Anesthesia complications.                    Social:  Smoker, No Alcohol Use, Recreational Drugs Drug use: Marijuana      Hematology/Oncology:  Hematology Normal   Oncology Normal                                   EENT/Dental:   No upper teeth           Cardiovascular:  Cardiovascular Normal                                              Pulmonary:  Pulmonary Normal                       Renal/:  Renal/ Normal                 Hepatic/GI:        Acute cholecystitis with right upper quadrant pain and nausea after eating fatty foods.               Musculoskeletal:  Musculoskeletal Normal                Neurological:  Neurology Normal                                      Endocrine:  Endocrine Normal            Psych:  Psychiatric Normal                    Physical Exam  General: Well nourished, Cooperative, Alert and Oriented    Airway:  Mallampati: II   Mouth Opening: Normal  TM Distance: > 6 cm  Tongue: Normal  Neck ROM: Normal ROM    Dental:  Periodontal disease  No upper teeth   Chest/Lungs:  Clear to auscultation, Normal Respiratory Rate    Heart:  Rate: Normal  Rhythm: Regular Rhythm  Sounds: Normal        Anesthesia Plan  Type of Anesthesia, risks & benefits discussed:    Anesthesia Type: Gen ETT  Intra-op Monitoring Plan: Standard ASA Monitors  Post Op Pain Control Plan: multimodal analgesia and IV/PO Opioids PRN  Induction:  IV  Informed Consent: Informed consent signed with the Patient and all parties understand the risks and agree with anesthesia plan.  All questions answered.   ASA Score: 2 Emergent  Anesthesia Plan Notes:       GETA  Benadryl 6.25mg iv, Decadron 8mg, iv Zofran 4mg iv, Pepcid 20mg iv  Sugammadex        Ready For Surgery From Anesthesia Perspective.     .

## 2025-01-30 NOTE — PLAN OF CARE
Nursing Transfer Note      Reason patient is being transferred: Released by Dr. Morgan from PACU    Transfer To: 3111    Transfer via stretcher    Transfer with cardiac monitoring leads, box in room    Transported by Susanna Ray    Medicines sent: Zosyn    Any special needs or follow-up needed: None    Chart send with patient: Yes    Notified: Ann RN (nurse)    Glasses on patient

## 2025-01-30 NOTE — PROGRESS NOTES
"[facility] Medicine  Progress Note    Patient Name: Muriel Vasquez  MRN: [id number]  Patient Class: IP- Inpatient   Admission Date: 1/27/2025  Length of Stay: 0 days  Attending Physician: Sheryl Guardado DO  Primary Care Provider: [facility]        Subjective     Principal Problem:Cholelithiasis with acute cholecystitis        HPI:  Otherwise healthy 34-year-old  female who has had 3 visits to the ER over the last several months for gallbladder issues  Patient has right upper quadrant pain precipitated by eating fatty foods.  It starts out mild and gets progressively worse; she also has nausea with it  In the ER she was afebrile and hemodynamically stable  WBC was 18.19 with a normal T bill but AST was 41; she is not pregnant  Does have evidence of a UTI with 13 WBC, moderate bacteria, and trace leukocytes  Abdominal ultrasound showed per radiologist Dr. Goyo Gallego:    "Impression:     Cholelithiasis with equivocal evaluation for acute cholecystitis.  The technologist reports a positive sonographic Farooq sign, although there are no additional sonographic features to suggest acute cholecystitis at this time.  If there is strong clinical concern for acute cholecystitis, HIDA scan could be performed for more sensitive evaluation.  Consider surgical consultation/follow-up if not previously performed in light of recurrent symptomatology.     Dilated common bile duct measuring up to 11-12 mm, slightly increased in extent from prior examination.  No definite filling defect appreciated within the visualized common bile duct to suggest choledocholithiasis.  This remains of uncertain etiology.  If there is clinical concern for biliary obstruction, ERCP or MRCP may be performed as warranted.     Mild hepatomegaly.     This report was flagged in Epic as abnormal."    She received a dose of Dilaudid, a L of fluid, and was started on a regimen of Zosyn; pain is 0/10->MRCP is " pending    Overview/Hospital Course:  No notes on file    Interval History:  Patient was seen and examined.  Overnight notes reviewed.  Patient reports intermittent right-sided abdominal pain.  She was NPO awaiting ERCP with GI.  Continue Zosyn.  Bilirubin LFTs noted to be elevated on a.m. labs.  Lipase normal.  GI and general surgery following.        Review of Systems   Respiratory:  Negative for shortness of breath.    Cardiovascular:  Negative for chest pain.   Gastrointestinal:  Positive for abdominal pain. Negative for nausea and vomiting.     Objective:     Vital Signs (Most Recent):  Temp: 98.4 °F (36.9 °C) (01/30/25 0346)  Pulse: (!) 58 (01/30/25 0912)  Resp: 17 (01/30/25 0912)  BP: 121/82 (01/30/25 0346)  SpO2: 97 % (01/30/25 0912) Vital Signs (24h Range):  Temp:  [97.9 °F (36.6 °C)-98.8 °F (37.1 °C)] 98.4 °F (36.9 °C)  Pulse:  [39-58] 58  Resp:  [16-18] 17  SpO2:  [96 %-100 %] 97 %  BP: (111-127)/(59-82) 121/82     Weight: 98.9 kg (218 lb 0.6 oz)  Body mass index is 34.15 kg/m².    Intake/Output Summary (Last 24 hours) at 1/30/2025 1457  Last data filed at 1/30/2025 1442  Gross per 24 hour   Intake 3074.98 ml   Output --   Net 3074.98 ml         Physical Exam  Vitals and nursing note reviewed.   Constitutional:       General: She is not in acute distress.     Appearance: Normal appearance. She is obese.   HENT:      Head: Normocephalic and atraumatic.      Mouth/Throat:      Mouth: Mucous membranes are moist.      Pharynx: Oropharynx is clear.   Eyes:      Extraocular Movements: Extraocular movements intact.   Cardiovascular:      Rate and Rhythm: Regular rhythm. Bradycardia present.   Pulmonary:      Effort: Pulmonary effort is normal.      Breath sounds: Normal breath sounds.   Abdominal:      General: Abdomen is flat.      Palpations: Abdomen is soft.      Tenderness: There is generalized abdominal tenderness. There is no guarding or rebound.   Musculoskeletal:      Cervical back: Normal range of  motion and neck supple.   Skin:     General: Skin is warm.   Neurological:      General: No focal deficit present.      Mental Status: She is alert and oriented to person, place, and time. Mental status is at baseline.   Psychiatric:         Mood and Affect: Mood normal.         Behavior: Behavior normal.             Significant Labs: All pertinent labs within the past 24 hours have been reviewed.  CBC:   Recent Labs   Lab 01/29/25  0524 01/30/25  0504   WBC 9.08 15.70*   HGB 11.0* 12.3   HCT 33.4* 37.2    320     CMP:   Recent Labs   Lab 01/29/25  0524 01/30/25  0504    139   K 3.6 4.0    103   CO2 26 29   GLU 80 115*   BUN 6 5*   CREATININE 0.7 0.8   CALCIUM 8.5* 9.0   PROT 5.6*  5.6* 6.5   ALBUMIN 3.4*  3.4* 3.9   BILITOT 1.6*  1.6* 3.0*   ALKPHOS 58  58 82   AST 92*  92* 224*   *  152* 246*   ANIONGAP 6* 7*     Magnesium:   Recent Labs   Lab 01/29/25  0524 01/30/25  0504   MG 1.5* 1.7       Significant Imaging: I have reviewed all pertinent imaging results/findings within the past 24 hours.    Assessment and Plan     * Cholelithiasis with acute cholecystitis    No definitive radiographic evidence of cholecystitis, but clinically presents as such  Concern for dilated common bile duct; concern for possible concomitant UTI; for now:  NPO  Maintenance fluids  Continue Zosyn  Zofran  Prn IV Morphine  MRCP pending-> depending on findings, may need GI consult  Surgery consult, for consideration for cholecystectomy  Urine culture with multiple organisms in predominance; repeat UA ordered  1/29:  Noted to have elevated bilirubin and LFTs; GI consulted with plan for ERCP tomorrow; continue Zosyn. ; CLD today with NPO at midnight; general surgery following  1/30:  GI and general surgery following; worsening bilirubin LFTs on a.m. labs; plan for ERCP today; patient was NPO; continue Zosyn      VTE Risk Mitigation (From admission, onward)           Ordered     IP VTE HIGH RISK PATIENT  Once          01/28/25 0556     Place sequential compression device  Until discontinued         01/28/25 0556                    Discharge Planning   JAZLYN: 2/1/2025     Code Status: Full Code   Medical Readiness for Discharge Date:   Discharge Plan A: Home with family                        Sheri Hernandez PA-C  Department of Hospital Medicine   Counts include 234 beds at the [facility]

## 2025-01-30 NOTE — SUBJECTIVE & OBJECTIVE
Interval History:  Patient was seen and examined.  Overnight notes reviewed.  Patient reports intermittent right-sided abdominal pain.  She was NPO awaiting ERCP with GI.  Continue Zosyn.  Bilirubin LFTs noted to be elevated on a.m. labs.  Lipase normal.  GI and general surgery following.        Review of Systems   Respiratory:  Negative for shortness of breath.    Cardiovascular:  Negative for chest pain.   Gastrointestinal:  Positive for abdominal pain. Negative for nausea and vomiting.     Objective:     Vital Signs (Most Recent):  Temp: 98.4 °F (36.9 °C) (01/30/25 0346)  Pulse: (!) 58 (01/30/25 0912)  Resp: 17 (01/30/25 0912)  BP: 121/82 (01/30/25 0346)  SpO2: 97 % (01/30/25 0912) Vital Signs (24h Range):  Temp:  [97.9 °F (36.6 °C)-98.8 °F (37.1 °C)] 98.4 °F (36.9 °C)  Pulse:  [39-58] 58  Resp:  [16-18] 17  SpO2:  [96 %-100 %] 97 %  BP: (111-127)/(59-82) 121/82     Weight: 98.9 kg (218 lb 0.6 oz)  Body mass index is 34.15 kg/m².    Intake/Output Summary (Last 24 hours) at 1/30/2025 1457  Last data filed at 1/30/2025 1442  Gross per 24 hour   Intake 3074.98 ml   Output --   Net 3074.98 ml         Physical Exam  Vitals and nursing note reviewed.   Constitutional:       General: She is not in acute distress.     Appearance: Normal appearance. She is obese.   HENT:      Head: Normocephalic and atraumatic.      Mouth/Throat:      Mouth: Mucous membranes are moist.      Pharynx: Oropharynx is clear.   Eyes:      Extraocular Movements: Extraocular movements intact.   Cardiovascular:      Rate and Rhythm: Regular rhythm. Bradycardia present.   Pulmonary:      Effort: Pulmonary effort is normal.      Breath sounds: Normal breath sounds.   Abdominal:      General: Abdomen is flat.      Palpations: Abdomen is soft.      Tenderness: There is generalized abdominal tenderness. There is no guarding or rebound.   Musculoskeletal:      Cervical back: Normal range of motion and neck supple.   Skin:     General: Skin is warm.    Neurological:      General: No focal deficit present.      Mental Status: She is alert and oriented to person, place, and time. Mental status is at baseline.   Psychiatric:         Mood and Affect: Mood normal.         Behavior: Behavior normal.             Significant Labs: All pertinent labs within the past 24 hours have been reviewed.  CBC:   Recent Labs   Lab 01/29/25 0524 01/30/25  0504   WBC 9.08 15.70*   HGB 11.0* 12.3   HCT 33.4* 37.2    320     CMP:   Recent Labs   Lab 01/29/25 0524 01/30/25  0504    139   K 3.6 4.0    103   CO2 26 29   GLU 80 115*   BUN 6 5*   CREATININE 0.7 0.8   CALCIUM 8.5* 9.0   PROT 5.6*  5.6* 6.5   ALBUMIN 3.4*  3.4* 3.9   BILITOT 1.6*  1.6* 3.0*   ALKPHOS 58  58 82   AST 92*  92* 224*   *  152* 246*   ANIONGAP 6* 7*     Magnesium:   Recent Labs   Lab 01/29/25 0524 01/30/25  0504   MG 1.5* 1.7       Significant Imaging: I have reviewed all pertinent imaging results/findings within the past 24 hours.

## 2025-01-30 NOTE — ASSESSMENT & PLAN NOTE
No definitive radiographic evidence of cholecystitis, but clinically presents as such  Concern for dilated common bile duct; concern for possible concomitant UTI; for now:  NPO  Maintenance fluids  Continue Zosyn  Zofran  Prn IV Morphine  MRCP pending-> depending on findings, may need GI consult  Surgery consult, for consideration for cholecystectomy  Urine culture with multiple organisms in predominance; repeat UA ordered  1/29:  Noted to have elevated bilirubin and LFTs; GI consulted with plan for ERCP tomorrow; continue Zosyn. ; CLD today with NPO at midnight; general surgery following  1/30:  GI and general surgery following; worsening bilirubin LFTs on a.m. labs; plan for ERCP today; patient was NPO; continue Zosyn

## 2025-01-30 NOTE — TRANSFER OF CARE
"Anesthesia Transfer of Care Note    Patient: Muriel Vasquez    Procedure(s) Performed: Procedure(s) (LRB):  ERCP (ENDOSCOPIC RETROGRADE CHOLANGIOPANCREATOGRAPHY) (N/A)    Patient location: PACU    Anesthesia Type: general    Transport from OR: Transported from OR on room air with adequate spontaneous ventilation    Post pain: adequate analgesia    Post assessment: no apparent anesthetic complications and tolerated procedure well    Post vital signs: stable    Level of consciousness: awake    Nausea/Vomiting: no nausea/vomiting    Complications: none    Transfer of care protocol was followed      Last vitals: Visit Vitals  /82   Pulse (!) 58   Temp 36.9 °C (98.4 °F) (Oral)   Resp 17   Ht 5' 7" (1.702 m)   Wt 98.9 kg (218 lb 0.6 oz)   LMP 01/26/2025 (Exact Date)   SpO2 97%   Breastfeeding No   BMI 34.15 kg/m²     "

## 2025-01-30 NOTE — ANESTHESIA PROCEDURE NOTES
Intubation    Date/Time: 1/30/2025 2:25 PM    Performed by: Sarah Sol CRNA  Authorized by: Darien Morgan MD    Intubation:     Induction:  Intravenous    Intubated:  Postinduction    Mask Ventilation:  Easy mask    Attempts:  1    Attempted By:  CRNA    Method of Intubation:  Direct and video laryngoscopy    Blade:  Huff 3    Laryngeal View Grade: Grade I - full view of cords      Difficult Airway Encountered?: No      Complications:  None    Airway Device:  Oral endotracheal tube    Airway Device Size:  7.0    Style/Cuff Inflation:  Cuffed (inflated to minimal occlusive pressure)    Tube secured:  21    Secured at:  The teeth    Placement Verified By:  Capnometry    Complicating Factors:  None    Findings Post-Intubation:  BS equal bilateral and atraumatic/condition of teeth unchanged

## 2025-01-31 VITALS
OXYGEN SATURATION: 99 % | HEIGHT: 67 IN | HEART RATE: 71 BPM | WEIGHT: 218.06 LBS | SYSTOLIC BLOOD PRESSURE: 104 MMHG | TEMPERATURE: 98 F | RESPIRATION RATE: 18 BRPM | DIASTOLIC BLOOD PRESSURE: 70 MMHG | BODY MASS INDEX: 34.22 KG/M2

## 2025-01-31 LAB
ALBUMIN SERPL BCP-MCNC: 3.5 G/DL (ref 3.5–5.2)
ALP SERPL-CCNC: 98 U/L (ref 55–135)
ALT SERPL W/O P-5'-P-CCNC: 283 U/L (ref 10–44)
ANION GAP SERPL CALC-SCNC: 5 MMOL/L (ref 8–16)
AST SERPL-CCNC: 142 U/L (ref 10–40)
BASOPHILS # BLD AUTO: 0.03 K/UL (ref 0–0.2)
BASOPHILS NFR BLD: 0.5 % (ref 0–1.9)
BILIRUB SERPL-MCNC: 1.8 MG/DL (ref 0.1–1)
BUN SERPL-MCNC: 7 MG/DL (ref 6–20)
CALCIUM SERPL-MCNC: 8.6 MG/DL (ref 8.7–10.5)
CHLORIDE SERPL-SCNC: 105 MMOL/L (ref 95–110)
CO2 SERPL-SCNC: 31 MMOL/L (ref 23–29)
CREAT SERPL-MCNC: 0.9 MG/DL (ref 0.5–1.4)
DIFFERENTIAL METHOD BLD: ABNORMAL
EOSINOPHIL # BLD AUTO: 0.1 K/UL (ref 0–0.5)
EOSINOPHIL NFR BLD: 2.5 % (ref 0–8)
ERYTHROCYTE [DISTWIDTH] IN BLOOD BY AUTOMATED COUNT: 13.3 % (ref 11.5–14.5)
EST. GFR  (NO RACE VARIABLE): >60 ML/MIN/1.73 M^2
GLUCOSE SERPL-MCNC: 89 MG/DL (ref 70–110)
HCT VFR BLD AUTO: 33.9 % (ref 37–48.5)
HGB BLD-MCNC: 10.9 G/DL (ref 12–16)
IMM GRANULOCYTES # BLD AUTO: 0.01 K/UL (ref 0–0.04)
IMM GRANULOCYTES NFR BLD AUTO: 0.2 % (ref 0–0.5)
LYMPHOCYTES # BLD AUTO: 2.2 K/UL (ref 1–4.8)
LYMPHOCYTES NFR BLD: 39.2 % (ref 18–48)
MAGNESIUM SERPL-MCNC: 1.6 MG/DL (ref 1.6–2.6)
MCH RBC QN AUTO: 32 PG (ref 27–31)
MCHC RBC AUTO-ENTMCNC: 32.2 G/DL (ref 32–36)
MCV RBC AUTO: 99 FL (ref 82–98)
MONOCYTES # BLD AUTO: 0.5 K/UL (ref 0.3–1)
MONOCYTES NFR BLD: 8.3 % (ref 4–15)
NEUTROPHILS # BLD AUTO: 2.8 K/UL (ref 1.8–7.7)
NEUTROPHILS NFR BLD: 49.3 % (ref 38–73)
NRBC BLD-RTO: 0 /100 WBC
OHS QRS DURATION: 72 MS
OHS QTC CALCULATION: 403 MS
PLATELET # BLD AUTO: 228 K/UL (ref 150–450)
PMV BLD AUTO: 11.6 FL (ref 9.2–12.9)
POTASSIUM SERPL-SCNC: 3.9 MMOL/L (ref 3.5–5.1)
PROT SERPL-MCNC: 5.8 G/DL (ref 6–8.4)
RBC # BLD AUTO: 3.41 M/UL (ref 4–5.4)
SODIUM SERPL-SCNC: 141 MMOL/L (ref 136–145)
T4 FREE SERPL-MCNC: 1.4 NG/DL (ref 0.71–1.51)
TSH SERPL DL<=0.005 MIU/L-ACNC: 1.85 UIU/ML (ref 0.34–5.6)
WBC # BLD AUTO: 5.67 K/UL (ref 3.9–12.7)

## 2025-01-31 PROCEDURE — 36415 COLL VENOUS BLD VENIPUNCTURE: CPT | Performed by: STUDENT IN AN ORGANIZED HEALTH CARE EDUCATION/TRAINING PROGRAM

## 2025-01-31 PROCEDURE — 63600175 PHARM REV CODE 636 W HCPCS: Performed by: SURGERY

## 2025-01-31 PROCEDURE — 25000003 PHARM REV CODE 250: Performed by: SURGERY

## 2025-01-31 PROCEDURE — 85025 COMPLETE CBC W/AUTO DIFF WBC: CPT | Performed by: SURGERY

## 2025-01-31 PROCEDURE — 83735 ASSAY OF MAGNESIUM: CPT | Performed by: SURGERY

## 2025-01-31 PROCEDURE — 84439 ASSAY OF FREE THYROXINE: CPT | Performed by: STUDENT IN AN ORGANIZED HEALTH CARE EDUCATION/TRAINING PROGRAM

## 2025-01-31 PROCEDURE — 80053 COMPREHEN METABOLIC PANEL: CPT | Performed by: SURGERY

## 2025-01-31 PROCEDURE — 84443 ASSAY THYROID STIM HORMONE: CPT | Performed by: STUDENT IN AN ORGANIZED HEALTH CARE EDUCATION/TRAINING PROGRAM

## 2025-01-31 RX ORDER — HYDROCODONE BITARTRATE AND ACETAMINOPHEN 5; 325 MG/1; MG/1
1 TABLET ORAL EVERY 6 HOURS PRN
Qty: 14 TABLET | Refills: 0 | Status: SHIPPED | OUTPATIENT
Start: 2025-01-31 | End: 2025-02-12

## 2025-01-31 RX ADMIN — MORPHINE SULFATE 2 MG: 2 INJECTION, SOLUTION INTRAMUSCULAR; INTRAVENOUS at 10:01

## 2025-01-31 RX ADMIN — HYDROCODONE BITARTRATE AND ACETAMINOPHEN 1 TABLET: 5; 325 TABLET ORAL at 12:01

## 2025-01-31 RX ADMIN — Medication 6 MG: at 12:01

## 2025-01-31 RX ADMIN — PIPERACILLIN SODIUM AND TAZOBACTAM SODIUM 3.38 G: 3; .375 INJECTION, POWDER, FOR SOLUTION INTRAVENOUS at 05:01

## 2025-01-31 RX ADMIN — ACETAMINOPHEN 650 MG: 325 TABLET ORAL at 12:01

## 2025-01-31 RX ADMIN — PANTOPRAZOLE SODIUM 40 MG: 40 INJECTION, POWDER, FOR SOLUTION INTRAVENOUS at 09:01

## 2025-01-31 NOTE — PLAN OF CARE
Email sent to [employer] [facility] requesting hospital follow up appointment.      In basket message sent to gen surgeon's office requesting hospital follow up appointment.

## 2025-01-31 NOTE — PLAN OF CARE
Patient cleared for discharge by case management to home.       01/31/25 1524   Final Note   Assessment Type Final Discharge Note   Anticipated Discharge Disposition Home   Hospital Resources/Appts/Education Provided Appointments scheduled and added to AVS

## 2025-01-31 NOTE — PLAN OF CARE
Problem: Adult Inpatient Plan of Care  Goal: Plan of Care Review  1/31/2025 0257 by Telma Eugene RN  Outcome: Progressing  1/31/2025 0257 by Telma Eugene RN  Outcome: Progressing  Goal: Patient-Specific Goal (Individualized)  1/31/2025 0257 by Telma Eugene RN  Outcome: Progressing  1/31/2025 0257 by Telma Eugene RN  Outcome: Progressing  Goal: Absence of Hospital-Acquired Illness or Injury  1/31/2025 0257 by Telma Eugene RN  Outcome: Progressing  1/31/2025 0257 by Telma Eugene RN  Outcome: Progressing  Goal: Optimal Comfort and Wellbeing  1/31/2025 0257 by Telma Eugene RN  Outcome: Progressing  1/31/2025 0257 by Telma Eugene RN  Outcome: Progressing  Goal: Readiness for Transition of Care  1/31/2025 0257 by Telma Eugene RN  Outcome: Progressing  1/31/2025 0257 by Telma Eugene RN  Outcome: Progressing     Problem: Wound  Goal: Optimal Coping  1/31/2025 0257 by Telma Eugene RN  Outcome: Progressing  1/31/2025 0257 by Telma Eugene RN  Outcome: Progressing  Goal: Optimal Functional Ability  1/31/2025 0257 by Telma Eugene RN  Outcome: Progressing  1/31/2025 0257 by Telma Eugene RN  Outcome: Progressing  Goal: Absence of Infection Signs and Symptoms  1/31/2025 0257 by Telma Eugene RN  Outcome: Progressing  1/31/2025 0257 by Telma Eugene RN  Outcome: Progressing  Goal: Improved Oral Intake  1/31/2025 0257 by Telma Eugene RN  Outcome: Progressing  1/31/2025 0257 by Telma Eugene RN  Outcome: Progressing  Goal: Optimal Pain Control and Function  1/31/2025 0257 by Telma Eugene RN  Outcome: Progressing  1/31/2025 0257 by Telma Eugene RN  Outcome: Progressing  Goal: Skin Health and Integrity  1/31/2025 0257 by Telma Eugene RN  Outcome: Progressing  1/31/2025 0257 by Telma Eugene, RN  Outcome: Progressing  Goal: Optimal Wound Healing  1/31/2025 0257 by Telma Eugene, RN  Outcome: Progressing  1/31/2025 0257 by Jabier,  MADALYN Hollis  Outcome: Progressing     Problem: Fall Injury Risk  Goal: Absence of Fall and Fall-Related Injury  1/31/2025 0257 by Telma Eugene RN  Outcome: Progressing  1/31/2025 0257 by Telma Eugene, RN  Outcome: Progressing

## 2025-01-31 NOTE — DISCHARGE SUMMARY
"[facility] Medicine  Discharge Summary      Patient Name: Muriel Vasquez  MRN: [id number]  DARBY: [id number]  Patient Class: IP- Inpatient  Admission Date: 1/27/2025  Hospital Length of Stay: 1 days  Discharge Date and Time:  01/31/2025 3:28 PM  Attending Physician: Sheryl Guardado DO   Discharging Provider: DARRIN Padron  Primary Care Provider: [facility]    Primary Care Team: Networked reference to record PCT     HPI:   Otherwise healthy 34-year-old  female who has had 3 visits to the ER over the last several months for gallbladder issues  Patient has right upper quadrant pain precipitated by eating fatty foods.  It starts out mild and gets progressively worse; she also has nausea with it  In the ER she was afebrile and hemodynamically stable  WBC was 18.19 with a normal T bill but AST was 41; she is not pregnant  Does have evidence of a UTI with 13 WBC, moderate bacteria, and trace leukocytes  Abdominal ultrasound showed per radiologist Dr. Goyo Gallego:    "Impression:     Cholelithiasis with equivocal evaluation for acute cholecystitis.  The technologist reports a positive sonographic Farooq sign, although there are no additional sonographic features to suggest acute cholecystitis at this time.  If there is strong clinical concern for acute cholecystitis, HIDA scan could be performed for more sensitive evaluation.  Consider surgical consultation/follow-up if not previously performed in light of recurrent symptomatology.     Dilated common bile duct measuring up to 11-12 mm, slightly increased in extent from prior examination.  No definite filling defect appreciated within the visualized common bile duct to suggest choledocholithiasis.  This remains of uncertain etiology.  If there is clinical concern for biliary obstruction, ERCP or MRCP may be performed as warranted.     Mild hepatomegaly.     This report was flagged in Epic as abnormal."    She received " a dose of Dilaudid, a L of fluid, and was started on a regimen of Zosyn; pain is 0/10->MRCP is pending    Procedure(s) (LRB):  ERCP (ENDOSCOPIC RETROGRADE CHOLANGIOPANCREATOGRAPHY) (N/A)      Hospital Course:   34 year old female was admitted to the hospital with acute cholecystitis.  She was started on IV zosyn and general surgery was consulted.  Dr. Perez performed cholecystectomy 1/29.  Patient developed leukocytosis, elevated LFTs and T. Bili.  MRCP was obtained which demonstrated gallstones and mild dilation of CBD.  GI was consulted.  ERCP was done 1/30 She was noted to be bradycardic with bigeminy on EKG and Cardiology was consulted.  Patient was evaluated by Cardiology who recommended continue monitoring on telemetry and signed off. Patient underwent ERCP with GI 1/30 with biliary sweep.  She tolerated procedure well, tolerated a regular diet.  Stable for d/c with close follow up with PCP and general surgery.  Plan of care discussed with patient and she verbalized understanding.  Patient was seen and examined on the day of discharge.     Goals of Care Treatment Preferences:  Code Status: Full Code      SDOH Screening:  The patient was screened for utility difficulties, food insecurity, transport difficulties, housing insecurity, and interpersonal safety and there were no concerns identified this admission.     Consults:   Consults (From admission, onward)          Status Ordering Provider     Inpatient consult to Cardiology  Once        Provider:  Gumaro Carrillo MD    Completed SUMAN ANDRE     Inpatient consult to Gastroenterology  Once        Provider:  Sabine Shelby MD    Acknowledged SUMAN ANDRE     Inpatient consult to Gastroenterology  Once        Provider:  Anil Garcia III, MD    Completed JANETTE ROACH     Inpatient consult to General Surgery  Once        Provider:  Mohan Perez Jr., MD    Completed ANUM MACE Cholelithiasis with acute  cholecystitis    No definitive radiographic evidence of cholecystitis, but clinically presents as such  Concern for dilated common bile duct; concern for possible concomitant UTI; for now:  NPO  Maintenance fluids  Continue Zosyn  Zofran  Prn IV Morphine  MRCP pending-> depending on findings, may need GI consult  Surgery consult, for consideration for cholecystectomy  Urine culture with multiple organisms in predominance; repeat UA ordered  1/29:  Noted to have elevated bilirubin and LFTs; GI consulted with plan for ERCP tomorrow; continue Zosyn. ; CLD today with NPO at midnight; general surgery following  1/30:  GI and general surgery following; worsening bilirubin LFTs on a.m. labs; plan for ERCP today; patient was NPO; continue Zosyn      Final Active Diagnoses:    Diagnosis Date Noted POA    PRINCIPAL PROBLEM:  Cholelithiasis with acute cholecystitis [K80.00] 01/28/2025 Yes      Problems Resolved During this Admission:       Discharged Condition: good    Disposition: Home or Self Care    Follow Up:   Follow-up Information       [facility]    Why: [facility] will contact patient to schedule.  Contact information:  [address] [place] [id number]  [phone]               Mohan Perez Jr., MD Follow up on 2/12/2025.    Specialty: General Surgery  Why: @ 1:15 pm  Contact information:  [address]  [address]  [place] [address]  [phone]                           Patient Instructions:      Notify your health care provider if you experience any of the following:  temperature >100.4     Notify your health care provider if you experience any of the following:  persistent nausea and vomiting or diarrhea     Notify your health care provider if you experience any of the following:  severe uncontrolled pain     Notify your health care provider if you experience any of the following:  difficulty breathing or increased cough     Notify your health care  provider if you experience any of the following:  severe persistent headache     Notify your health care provider if you experience any of the following:  persistent dizziness, light-headedness, or visual disturbances     Notify your health care provider if you experience any of the following:  increased confusion or weakness     Notify your health care provider if you experience any of the following:   Order Comments: Chest pain, shortness of breath     Activity as tolerated       Significant Diagnostic Studies: Labs: CMP   Recent Labs   Lab 01/30/25  0504 01/31/25  0447    141   K 4.0 3.9    105   CO2 29 31*   * 89   BUN 5* 7   CREATININE 0.8 0.9   CALCIUM 9.0 8.6*   PROT 6.5 5.8*   ALBUMIN 3.9 3.5   BILITOT 3.0* 1.8*   ALKPHOS 82 98   * 142*   * 283*   ANIONGAP 7* 5*    and CBC   Recent Labs   Lab 01/30/25  0504 01/31/25  0447   WBC 15.70* 5.67   HGB 12.3 10.9*   HCT 37.2 33.9*    228       Pending Diagnostic Studies:       None           Medications:  Reconciled Home Medications:      Medication List        START taking these medications      HYDROcodone-acetaminophen 5-325 mg per tablet  Commonly known as: NORCO  Take 1 tablet by mouth every 6 (six) hours as needed for Pain.            STOP taking these medications      acetaminophen 325 MG tablet  Commonly known as: TYLENOL              Indwelling Lines/Drains at time of discharge:   Lines/Drains/Airways       None                   Time spent on the discharge of patient: 35 minutes         DARRIN Padron  Department of Hospital Medicine  [facility]

## 2025-02-01 LAB — BACTERIA UR CULT: NO GROWTH

## 2025-02-01 NOTE — NURSING
Discharge instructions given to patient both written and verbally.   Upcoming appointments reviewed.   Medication instructions reviewed.  Time allowed for Q&A.   No needs expressed from patient  Pt walked out by personal preference out to vehicle picked up by family member in stable condition.

## 2025-02-01 NOTE — DISCHARGE INSTRUCTIONS
Our goal at [facility] is to always give you outstanding care and exceptional service. You may receive a survey by mail, text or e-mail in the next 7-10 days from Tavares Beal and our leadership team asking about the care you received with us. The survey should only take 5-10 minutes to complete and is very important to us.     Your feedback provides us with a way to recognize our staff who work tirelessly to provide the best care! Also, your responses help us learn how to improve when your experience was below our aspiration of excellence. We WILL use your feedback to continue making improvements to help us provide the highest quality care. We keep your personal information and feedback confidential. We appreciate your time completing this survey and can't wait to hear from you!!!     We want you to leave us today feeling like you can DEFINITELY recommend us to others! We look forward to your continued care with us! Thanks so much for choosing [facility] for your healthcare needs!

## 2025-02-07 ENCOUNTER — HOSPITAL ENCOUNTER (EMERGENCY)
Facility: HOSPITAL | Age: 35
Discharge: HOME | End: 2025-02-08
Attending: STUDENT IN AN ORGANIZED HEALTH CARE EDUCATION/TRAINING PROGRAM
Payer: MEDICAID

## 2025-02-07 VITALS
SYSTOLIC BLOOD PRESSURE: 105 MMHG | DIASTOLIC BLOOD PRESSURE: 56 MMHG | RESPIRATION RATE: 12 BRPM | HEIGHT: 67 IN | WEIGHT: 220 LBS | OXYGEN SATURATION: 100 % | BODY MASS INDEX: 34.53 KG/M2 | TEMPERATURE: 98 F | HEART RATE: 71 BPM

## 2025-02-07 DIAGNOSIS — T81.49XA CELLULITIS, WOUND, POST-OPERATIVE: ICD-10-CM

## 2025-02-07 DIAGNOSIS — R10.9 ABDOMINAL PAIN: Primary | ICD-10-CM

## 2025-02-07 LAB
ALBUMIN SERPL BCP-MCNC: 4.3 G/DL (ref 3.5–5.2)
ALP SERPL-CCNC: 75 U/L (ref 55–135)
ALT SERPL W/O P-5'-P-CCNC: 50 U/L (ref 10–44)
ANION GAP SERPL CALC-SCNC: 7 MMOL/L (ref 8–16)
AST SERPL-CCNC: 20 U/L (ref 10–40)
BACTERIA #/AREA URNS HPF: ABNORMAL /HPF
BASOPHILS # BLD AUTO: 0.07 K/UL (ref 0–0.2)
BASOPHILS NFR BLD: 0.6 % (ref 0–1.9)
BILIRUB SERPL-MCNC: 0.6 MG/DL (ref 0.1–1)
BILIRUB UR QL STRIP: NEGATIVE
BUN SERPL-MCNC: 10 MG/DL (ref 6–20)
CALCIUM SERPL-MCNC: 9.5 MG/DL (ref 8.7–10.5)
CHLORIDE SERPL-SCNC: 104 MMOL/L (ref 95–110)
CLARITY UR: ABNORMAL
CO2 SERPL-SCNC: 27 MMOL/L (ref 23–29)
COLOR UR: YELLOW
CREAT SERPL-MCNC: 0.7 MG/DL (ref 0.5–1.4)
DIFFERENTIAL METHOD BLD: ABNORMAL
EOSINOPHIL # BLD AUTO: 0.2 K/UL (ref 0–0.5)
EOSINOPHIL NFR BLD: 1.9 % (ref 0–8)
ERYTHROCYTE [DISTWIDTH] IN BLOOD BY AUTOMATED COUNT: 14.4 % (ref 11.5–14.5)
EST. GFR  (NO RACE VARIABLE): >60 ML/MIN/1.73 M^2
GLUCOSE SERPL-MCNC: 102 MG/DL (ref 70–110)
GLUCOSE UR QL STRIP: NEGATIVE
HCT VFR BLD AUTO: 42.9 % (ref 37–48.5)
HGB BLD-MCNC: 14.4 G/DL (ref 12–16)
HGB UR QL STRIP: ABNORMAL
IMM GRANULOCYTES # BLD AUTO: 0.07 K/UL (ref 0–0.04)
IMM GRANULOCYTES NFR BLD AUTO: 0.6 % (ref 0–0.5)
KETONES UR QL STRIP: ABNORMAL
LDH SERPL L TO P-CCNC: 0.91 MMOL/L (ref 0.5–2.2)
LEUKOCYTE ESTERASE UR QL STRIP: NEGATIVE
LYMPHOCYTES # BLD AUTO: 3.3 K/UL (ref 1–4.8)
LYMPHOCYTES NFR BLD: 25.6 % (ref 18–48)
MCH RBC QN AUTO: 33.4 PG (ref 27–31)
MCHC RBC AUTO-ENTMCNC: 33.6 G/DL (ref 32–36)
MCV RBC AUTO: 100 FL (ref 82–98)
MICROSCOPIC COMMENT: ABNORMAL
MONOCYTES # BLD AUTO: 1 K/UL (ref 0.3–1)
MONOCYTES NFR BLD: 7.5 % (ref 4–15)
NEUTROPHILS # BLD AUTO: 8.1 K/UL (ref 1.8–7.7)
NEUTROPHILS NFR BLD: 63.8 % (ref 38–73)
NITRITE UR QL STRIP: NEGATIVE
NRBC BLD-RTO: 0 /100 WBC
PH UR STRIP: 6 [PH] (ref 5–8)
PLATELET # BLD AUTO: 403 K/UL (ref 150–450)
PMV BLD AUTO: 11.1 FL (ref 9.2–12.9)
POTASSIUM SERPL-SCNC: 3.7 MMOL/L (ref 3.5–5.1)
PROT SERPL-MCNC: 7.5 G/DL (ref 6–8.4)
PROT UR QL STRIP: ABNORMAL
RBC # BLD AUTO: 4.31 M/UL (ref 4–5.4)
RBC #/AREA URNS HPF: 5 /HPF (ref 0–4)
SAMPLE: NORMAL
SODIUM SERPL-SCNC: 138 MMOL/L (ref 136–145)
SP GR UR STRIP: >1.03 (ref 1–1.03)
SQUAMOUS #/AREA URNS HPF: 35 /HPF
URN SPEC COLLECT METH UR: ABNORMAL
UROBILINOGEN UR STRIP-ACNC: NEGATIVE EU/DL
WBC # BLD AUTO: 12.72 K/UL (ref 3.9–12.7)
WBC #/AREA URNS HPF: 2 /HPF (ref 0–5)

## 2025-02-07 PROCEDURE — 85025 COMPLETE CBC W/AUTO DIFF WBC: CPT

## 2025-02-07 PROCEDURE — 63600175 PHARM REV CODE 636 W HCPCS: Mod: TB,JZ | Performed by: STUDENT IN AN ORGANIZED HEALTH CARE EDUCATION/TRAINING PROGRAM

## 2025-02-07 PROCEDURE — 81001 URINALYSIS AUTO W/SCOPE: CPT | Performed by: STUDENT IN AN ORGANIZED HEALTH CARE EDUCATION/TRAINING PROGRAM

## 2025-02-07 PROCEDURE — 93010 ELECTROCARDIOGRAM REPORT: CPT | Mod: ,,, | Performed by: INTERNAL MEDICINE

## 2025-02-07 PROCEDURE — 80053 COMPREHEN METABOLIC PANEL: CPT

## 2025-02-07 PROCEDURE — 96374 THER/PROPH/DIAG INJ IV PUSH: CPT

## 2025-02-07 PROCEDURE — 99284 EMERGENCY DEPT VISIT MOD MDM: CPT | Mod: 25

## 2025-02-07 PROCEDURE — 93005 ELECTROCARDIOGRAM TRACING: CPT | Performed by: INTERNAL MEDICINE

## 2025-02-07 RX ORDER — MUPIROCIN 20 MG/G
OINTMENT TOPICAL 3 TIMES DAILY
Qty: 15 G | Refills: 0 | Status: SHIPPED | OUTPATIENT
Start: 2025-02-07 | End: 2025-02-14

## 2025-02-07 RX ORDER — SULFAMETHOXAZOLE AND TRIMETHOPRIM 800; 160 MG/1; MG/1
1 TABLET ORAL 2 TIMES DAILY
Qty: 14 TABLET | Refills: 0 | Status: SHIPPED | OUTPATIENT
Start: 2025-02-07 | End: 2025-02-14

## 2025-02-07 RX ORDER — KETOROLAC TROMETHAMINE 30 MG/ML
15 INJECTION, SOLUTION INTRAMUSCULAR; INTRAVENOUS
Status: COMPLETED | OUTPATIENT
Start: 2025-02-07 | End: 2025-02-07

## 2025-02-07 RX ORDER — MUPIROCIN 20 MG/G
OINTMENT TOPICAL
Status: DISCONTINUED | OUTPATIENT
Start: 2025-02-07 | End: 2025-02-08 | Stop reason: HOSPADM

## 2025-02-07 RX ADMIN — KETOROLAC TROMETHAMINE 15 MG: 30 INJECTION, SOLUTION INTRAMUSCULAR; INTRAVENOUS at 10:02

## 2025-02-08 LAB
OHS QRS DURATION: 78 MS
OHS QTC CALCULATION: 412 MS

## 2025-02-08 NOTE — ED PROVIDER NOTES
Encounter Date: 2/7/2025       History     Chief Complaint   Patient presents with    Post-op Problem     Gallbladder removal about 2 weeks ago; pt reports incision site appears split, no bleeding; pt concerned for infection      HPI    34-year-old female with no significant past medical history presents to the emergency department with concerns of a wound towards her abdomen after she recently had a laparoscopic cholecystectomy.  She states symptoms have been present for the past 2 days.  She reports subjective fever and chills.  She reports some minor abdominal pain towards the incision site.  She denies nausea, vomiting, diarrhea, chest pain, shortness for breath, dysuria.  She does report urinary urgency for the past week or 2.  She does state that she noticed some scant discharge from the wound.  She states that she has follow up with her surgeon on the 12th of February.    On chart review  patient was discharged on January 31st.  At that time she had a cholecystectomy performed on the 29th of January.  MRCP was performed that showed gallstones and mild dilation of the common bile duct.  ERCP was done on January 30th with GI.  Review of patient's allergies indicates:  No Known Allergies  Past Medical History:   Diagnosis Date    Hidradenitis      Past Surgical History:   Procedure Laterality Date    ENDOSCOPIC ULTRASOUND OF UPPER GASTROINTESTINAL TRACT N/A 1/28/2025    Procedure: ULTRASOUND, UPPER GI TRACT, ENDOSCOPIC;  Surgeon: Anil Garcia III, MD;  Location: [facility] ENDO;  Service: Endoscopy;  Laterality: N/A;    ERCP N/A 1/30/2025    Procedure: ERCP (ENDOSCOPIC RETROGRADE CHOLANGIOPANCREATOGRAPHY);  Surgeon: Anil Garcia III, MD;  Location: [facility] ENDO;  Service: Endoscopy;  Laterality: N/A;    LAPAROSCOPIC CHOLECYSTECTOMY N/A 1/29/2025    Procedure: CHOLECYSTECTOMY, LAPAROSCOPIC;  Surgeon: Mohan Perez Jr., MD;  Location: [facility] OR;  Service: General;  Laterality: N/A;     No family  history on file.  Social History     Tobacco Use    Smoking status: Every Day     Types: Cigarettes, Vaping with nicotine    Smokeless tobacco: Never    Tobacco comments:     About 5 cigs a day   Substance Use Topics    Alcohol use: Not Currently    Drug use: Yes     Types: Marijuana     Review of Systems   Constitutional:  Negative for fever.   HENT:  Negative for sore throat.    Respiratory:  Negative for shortness of breath.    Cardiovascular:  Negative for chest pain.   Gastrointestinal:  Positive for abdominal pain. Negative for nausea and vomiting.   Genitourinary:  Positive for urgency. Negative for dysuria.   Musculoskeletal:  Negative for back pain.   Skin:  Positive for wound.   Neurological:  Negative for weakness.   Hematological:  Does not bruise/bleed easily.       Physical Exam     Initial Vitals [02/07/25 1957]   BP Pulse Resp Temp SpO2   126/83 93 16 98.3 °F (36.8 °C) 98 %      MAP       --         Physical Exam    Nursing note and vitals reviewed.  Constitutional: She appears well-developed and well-nourished.  Non-toxic appearance.   HENT:   Head: Normocephalic and atraumatic.   Eyes: EOM are normal. Pupils are equal, round, and reactive to light.   Neck: Neck supple.   Normal range of motion.  Cardiovascular:  Normal rate and regular rhythm.           Abdominal: Abdomen is soft. There is no abdominal tenderness.     Right above patient's umbilicus she does have a small amount of wound dehiscence towards the laparoscopic site that is mildly tender to palpation, no abdominal tenderness to palpation that is sore.  No guarding or rebound.  No significant drainage appreciated.   Musculoskeletal:      Cervical back: Normal range of motion and neck supple.     Neurological: She is alert and oriented to person, place, and time.   Skin: Skin is warm and dry.         ED Course   Procedures  Labs Reviewed   CBC W/ AUTO DIFFERENTIAL - Abnormal       Result Value    WBC 12.72 (*)     RBC 4.31      Hemoglobin  14.4      Hematocrit 42.9       (*)     MCH 33.4 (*)     MCHC 33.6      RDW 14.4      Platelets 403      MPV 11.1      Immature Granulocytes 0.6 (*)     Gran # (ANC) 8.1 (*)     Immature Grans (Abs) 0.07 (*)     Lymph # 3.3      Mono # 1.0      Eos # 0.2      Baso # 0.07      nRBC 0      Gran % 63.8      Lymph % 25.6      Mono % 7.5      Eosinophil % 1.9      Basophil % 0.6      Differential Method Automated     COMPREHENSIVE METABOLIC PANEL - Abnormal    Sodium 138      Potassium 3.7      Chloride 104      CO2 27      Glucose 102      BUN 10      Creatinine 0.7      Calcium 9.5      Total Protein 7.5      Albumin 4.3      Total Bilirubin 0.6      Alkaline Phosphatase 75      AST 20      ALT 50 (*)     eGFR >60.0      Anion Gap 7 (*)    URINALYSIS, REFLEX TO URINE CULTURE - Abnormal    Specimen UA Urine, Clean Catch      Color, UA Yellow      Appearance, UA Hazy (*)     pH, UA 6.0      Specific Gravity, UA >1.030 (*)     Protein, UA Trace (*)     Glucose, UA Negative      Ketones, UA Trace (*)     Bilirubin (UA) Negative      Occult Blood UA 1+ (*)     Nitrite, UA Negative      Urobilinogen, UA Negative      Leukocytes, UA Negative      Narrative:     Specimen Source->Urine   URINALYSIS MICROSCOPIC - Abnormal    RBC, UA 5 (*)     WBC, UA 2      Bacteria Rare      Squam Epithel, UA 35      Microscopic Comment SEE COMMENT      Narrative:     Specimen Source->Urine   LIPASE   ISTAT LACTATE    POC Lactate 0.91      Sample VENOUS          ECG Results              EKG 12-lead (Final result)        Collection Time Result Time QRS Duration OHS QTC Calculation    02/07/25 21:16:57 02/08/25 18:25:04 78 412                     Final result by Interface, Lab In [facility] (02/08/25 18:25:16)                   Narrative:    Test Reason : R10.9,    Vent. Rate :  80 BPM     Atrial Rate :  80 BPM     P-R Int : 162 ms          QRS Dur :  78 ms      QT Int : 358 ms       P-R-T Axes :  47  78  41 degrees    QTcB Int : 412  "ms    Normal sinus rhythm  Normal ECG  When compared with ECG of 28-Jan-2025 01:20,  Premature atrial complexes are no longer Present  Vent. rate has increased by  28 bpm  Confirmed by Tobin Ayala ([id number]) on 2/8/2025 6:25:02 PM    Referred By: AMYERRAL SELF           Confirmed By: Tobin Ayala                                  Imaging Results    None          Medications   ketorolac injection 15 mg (15 mg Intravenous Given 2/7/25 2228)     Medical Decision Making  Risk  Prescription drug management.    In brief, 34-year-old female with medical history of recent cholecystectomy presents to the emergency department with concerns of wound dehiscence towards her laparoscopic site.  Symptoms associated with some minor abdominal pain.  She also endorses some subjective fever and chills.  No nausea, vomiting, diarrhea, chest pain, shortness for breath.  Has also been experiencing some urinary urgency.  Initial vital signs within normal limits.  Physical examination as stated above.    Chart Review:  Chart review as stated in HPI    BP (!) 105/56   Pulse 71   Temp 98.3 °F (36.8 °C) (Oral)   Resp 12   Ht 5' 7" (1.702 m)   Wt 99.8 kg (220 lb)   LMP 01/26/2025 (Exact Date)   SpO2 100%   Breastfeeding No   BMI 34.46 kg/m²     Differentials:  Cellulitis, abscess, wound dehiscence, electrolyte abnormality, metabolic derangement, urinary tract infection, deep space abdominal infection, intra-abdominal infection, among others.    Orders:  CBC, CMP, urinalysis, lactate    Results:  CBC returned with mild elevation of white blood cell count to 12.72 otherwise within normal limits.  CMP within normal limits, no transaminitis or elevated total bilirubin.  Urinalysis without findings concerning for urinary tract infection.      Interventions:  Toradol, Bactroban    Plan:  After receiving Toradol patient felt improvement in her symptoms.  Discussed with the patient that she likely does have a soft tissue infection " surrounding her laparoscopic site.  Do not suspect a deep space infection given patient's overall abdominal examination, abdomen is soft with minimal tenderness that has localized surrounding the soft tissue infection site.  Lactate was within normal limits.  Patient's wound was dressed with mupirocin, nonadherent gauze and then gauze pad.  Discussed with her plan to discharge home with a prescription for Bactrim as well as Bactroban for the next 7 days.  Advised that she maintain her follow up with her surgeon.  Carefully reviewed return precautions.  Patient understood and agreed with plan of care.  Case was discussed with Dr. Flores.    This text was transcribed using voice software.    MDM MATRIX SUMMARY    Jeremy Carballo MD  PGY-4 [employer]  7:24 PM 2/8/2025                                    Clinical Impression:  Final diagnoses:  [R10.9] Abdominal pain (Primary)  [T81.49XA] Cellulitis, wound, post-operative          ED Disposition Condition    Discharge Stable          ED Prescriptions       Medication Sig Dispense Start Date End Date Auth. Provider    mupirocin (BACTROBAN) 2 % ointment Apply topically 3 (three) times daily. for 7 days 15 g 2/7/2025 2/14/2025 Jeremy Carballo MD    sulfamethoxazole-trimethoprim 800-160mg (BACTRIM DS) 800-160 mg Tab Take 1 tablet by mouth 2 (two) times daily. for 7 days 14 tablet 2/7/2025 2/14/2025 Jeremy Carballo MD          Follow-up Information       Follow up With Specialties Details Why Contact Info Additional Information    [facility] - Emergency Dept Emergency Medicine  If symptoms worsen [address] [facility] [address] [phone] 1st floor    Heaven Rodríguez FNP Family Medicine Call in 2 days For reassessment symptoms [address] [facility] [address] [phone]       Your surgeon   Please maintain her follow up on February 12th               Jeremy Carballo MD  Resident  02/08/25 [id number]

## 2025-02-08 NOTE — DISCHARGE INSTRUCTIONS
Please return to the emergency department if you experience nausea or vomiting with inability to eat or drink, fever that has not improved with medication, chest pain, shortness of breath, the worst headache of your life, if you notice increased redness surrounding the site that has infected or worsening of your abdominal pain or if you noticed increased drainage.      We have discharged you with a prescription for Bactroban which you will apply 3 times daily (or every 8 hours) towards your wound.  After applying the Bactroban you will cover the wound with nonadherent gauze and then place gauze on top and tape it secure.  Please make sure to change your dressing daily.  We have also discharged you with a prescription for Bactrim which you will take 2 times daily (or every 8 hours) for the next 7 days for your infection.

## 2025-02-12 ENCOUNTER — OFFICE VISIT (OUTPATIENT)
Dept: SURGERY | Facility: CLINIC | Age: 35
End: 2025-02-12
Payer: MEDICAID

## 2025-02-12 VITALS
WEIGHT: 220 LBS | HEIGHT: 67 IN | SYSTOLIC BLOOD PRESSURE: 118 MMHG | DIASTOLIC BLOOD PRESSURE: 87 MMHG | TEMPERATURE: 98 F | BODY MASS INDEX: 34.53 KG/M2 | HEART RATE: 103 BPM

## 2025-02-12 DIAGNOSIS — Z90.49 S/P LAPAROSCOPIC CHOLECYSTECTOMY: Primary | ICD-10-CM

## 2025-02-12 PROCEDURE — 3074F SYST BP LT 130 MM HG: CPT | Mod: CPTII,,, | Performed by: SURGERY

## 2025-02-12 PROCEDURE — 3079F DIAST BP 80-89 MM HG: CPT | Mod: CPTII,,, | Performed by: SURGERY

## 2025-02-12 PROCEDURE — 99999 PR PBB SHADOW E&M-EST. PATIENT-LVL III: CPT | Mod: PBBFAC,,, | Performed by: SURGERY

## 2025-02-12 PROCEDURE — 99024 POSTOP FOLLOW-UP VISIT: CPT | Mod: ,,, | Performed by: SURGERY

## 2025-02-12 PROCEDURE — 1159F MED LIST DOCD IN RCRD: CPT | Mod: CPTII,,, | Performed by: SURGERY

## 2025-02-12 PROCEDURE — 99213 OFFICE O/P EST LOW 20 MIN: CPT | Mod: PBBFAC,PN | Performed by: SURGERY

## 2025-02-12 NOTE — PROGRESS NOTES
GENERAL SURGERY  POST-OP PROGRESS NOTE    HPI: Muriel Vasquez is a 34 y.o. female status post laparoscopic cholecystectomy on 01/29/2025 for acute cholecystitis. Has some concern for retained stone therefore underwent ERCP on 01/30/2025 which showed dilated common bile duct, sphincterotomy and balloon sweeps were performed removing sludge.  Here today for follow-up. Overall doing well.  Did present to the emergency room due to concerns for infection at her periumbilical incision site. She was given antibiotics.  On follow up today she has a small superficial dehiscence but no surrounding erythema or signs of infection. Mild discomfort at the abdomen from incision sites but overall no severe pain. No fevers or chills.  Tolerating diet.    VITALS:  There were no vitals filed for this visit.    PHYSICAL EXAM:  Upper abdominal incisions well healed without signs of infection or breakdown, the periumbilical incision site has a superficial breakdown but no surrounding erythema, fluctuance or underlying fluid collection    PATHOLOGY:  GALLBLADDER, CHOLECYSTECTOMY:     --CHRONIC CHOLECYSTITIS.     --CHOLELITHIASIS.     --ONE BENIGN LYMPH NODE     ASSESSMENT & PLAN:  34 y.o. female s/p laparoscopic cholecystectomy, ERCP  -patient overall doing well  -small superficial breakdown of the periumbilical incision site likely from either thermal injury or suture pulling through, no evidence of infection, can complete antibiotics but no need for further intervention  -residual soreness of the abdomen likely related to postsurgical changes and should resolve  -continue diet as tolerated  -avoid heavy lifting and straining for another 2 weeks  -return to clinic as needed

## (undated) DEVICE — SOL NACL IRR 3000ML

## (undated) DEVICE — NDL PNEUMO INSUFFLATI 120MM

## (undated) DEVICE — ADHESIVE DERMABOND ADVANCED

## (undated) DEVICE — SUT VICRYL+ 27 UR-6 VIOL

## (undated) DEVICE — BAG TISSUE RETRIEVAL 5MM

## (undated) DEVICE — COVER LIGHT HANDLE 80/CA

## (undated) DEVICE — SUT MCRYL PLUS 4-0 PS2 27IN

## (undated) DEVICE — APPLIER CLIP ENDO LIGAMAX 5MM

## (undated) DEVICE — TRAY GENERAL LAPAROSCOPY SMH

## (undated) DEVICE — CANNULA ENDOPATH XCEL 5X100MM

## (undated) DEVICE — ELECTRODE L HOOK 13IN 33M

## (undated) DEVICE — SUT MONOCRYL 4-0 PS-2

## (undated) DEVICE — NDL PNEUMOPERITONEUM 150MM

## (undated) DEVICE — SOL NACL IRR 1000ML BTL

## (undated) DEVICE — GLOVE SENSICARE PI SURG 7

## (undated) DEVICE — SYS SEE SHARP SCOPE ANTIFOG

## (undated) DEVICE — TROCAR KII FIOS ZTHREAD 12X100

## (undated) DEVICE — TROCAR ENDOPATH XCEL 5X100MM

## (undated) DEVICE — SCISSOR 5MMX35CM DIRECT DRIVE

## (undated) DEVICE — DISSECTOR KITTNER 5MM T 45CM S

## (undated) DEVICE — IRRIGATOR SUCTION W/TIP

## (undated) DEVICE — CORD MPLR STR PLUG DISP 10FT

## (undated) DEVICE — COVER CAMERA OPERATING ROOM

## (undated) DEVICE — ELECTRODE REM PLYHSV RETURN 9